# Patient Record
Sex: MALE | Employment: FULL TIME | URBAN - METROPOLITAN AREA
[De-identification: names, ages, dates, MRNs, and addresses within clinical notes are randomized per-mention and may not be internally consistent; named-entity substitution may affect disease eponyms.]

---

## 2018-09-19 ENCOUNTER — OFFICE VISIT (OUTPATIENT)
Dept: ORTHOPEDIC SURGERY | Age: 38
End: 2018-09-19

## 2018-09-19 VITALS — HEIGHT: 72 IN | BODY MASS INDEX: 29.8 KG/M2 | WEIGHT: 220 LBS | RESPIRATION RATE: 14 BRPM

## 2018-09-19 DIAGNOSIS — M25.562 ACUTE PAIN OF LEFT KNEE: ICD-10-CM

## 2018-09-19 DIAGNOSIS — S83.242A TEAR OF MEDIAL MENISCUS OF LEFT KNEE, CURRENT, UNSPECIFIED TEAR TYPE, INITIAL ENCOUNTER: Primary | ICD-10-CM

## 2018-09-19 PROCEDURE — 99202 OFFICE O/P NEW SF 15 MIN: CPT | Performed by: PHYSICIAN ASSISTANT

## 2018-09-19 RX ORDER — DEXTROAMPHETAMINE SACCHARATE, AMPHETAMINE ASPARTATE, DEXTROAMPHETAMINE SULFATE AND AMPHETAMINE SULFATE 5; 5; 5; 5 MG/1; MG/1; MG/1; MG/1
60 TABLET ORAL
COMMUNITY
End: 2018-10-17

## 2018-09-19 RX ORDER — DEXTROAMPHETAMINE SACCHARATE, AMPHETAMINE ASPARTATE MONOHYDRATE, DEXTROAMPHETAMINE SULFATE AND AMPHETAMINE SULFATE 7.5; 7.5; 7.5; 7.5 MG/1; MG/1; MG/1; MG/1
CAPSULE, EXTENDED RELEASE ORAL
Refills: 0 | COMMUNITY
Start: 2018-08-27 | End: 2018-10-17

## 2018-09-19 ASSESSMENT — ENCOUNTER SYMPTOMS
GASTROINTESTINAL NEGATIVE: 1
EYES NEGATIVE: 1
RESPIRATORY NEGATIVE: 1

## 2018-09-19 NOTE — PROGRESS NOTES
Review of Systems   Constitutional: Negative. HENT: Negative. Eyes: Negative. Respiratory: Negative. Cardiovascular: Negative. Gastrointestinal: Negative. Genitourinary: Negative. Musculoskeletal: Positive for joint pain and myalgias. Skin: Negative. Neurological: Negative. Endo/Heme/Allergies: Positive for environmental allergies. Psychiatric/Behavioral: Negative. HPI:  Vila Curling is a 43y.o. year old male who complains of Left knee pain and swelling. The Left knee pain assessment is:   Intensity: 7/10   Location:        medial   Description:    aching and sharp    The symptoms started 9/13/18  Cause of injury was  Sports injury (patient was Punting a football and came down on the left leg and felt immediate pain on the medial aspect of his knee. .  Previous treatment for this episode has included NSAIDS, tylenol, ice and rest.    Symptoms improve with rest, avoiding painful activities. The symptoms are worse with activity, deep knee bending, weight bearing, running. The progression of symptoms, overall course: waxing and waning but worse overall. Prior to this episode, knee history is: surgery including meniscus repair in 2005, unsure of physician     Is [facility]     No past medical history on file. No past surgical history on file. No family history on file.     Social History     Social History    Marital status:      Spouse name: N/A    Number of children: N/A    Years of education: N/A     Social History Main Topics    Smoking status: Never Smoker    Smokeless tobacco: Never Used    Alcohol use None    Drug use: Unknown    Sexual activity: Not Asked     Other Topics Concern    None     Social History Narrative    None       Current Outpatient Prescriptions   Medication Sig Dispense Refill    amphetamine-dextroamphetamine (ADDERALL XR) 30 MG extended release capsule TK 1 C PO BID  0    amphetamine-dextroamphetamine discussed. Questions answered. Continue ibuprofen as needed   Add tylenol as needed   Continue ice as needed   MRI left knee   Call office once MRI is complete     The patient was advised that NSAID-type medications have two very important potential side effects: gastrointestinal irritation including hemorrhage and renal injuries. He was asked to take the medication with food and to stop if he experiences any GI upset. I asked him to call for vomiting, abdominal pain or black/bloody stools. The patient expresses understanding of these issues and questions were answered.     Add Tylenol (also known as acetaminophen) as needed   Take 2 extra strength (500 mg) or 3 regular strength (325 mg) up to three times a day  It is OK to take Tylenol in conjunction with NSAID's (Advil, Aleve, Naprosyn, etc)  If taking other medications that have acetaminophen ensure total acetaminophen dose for any 24 period is less than 3,000 mg

## 2018-10-06 ENCOUNTER — HOSPITAL ENCOUNTER (OUTPATIENT)
Dept: MRI IMAGING | Age: 38
Discharge: HOME | End: 2018-10-06
Payer: COMMERCIAL

## 2018-10-06 DIAGNOSIS — S83.242A TEAR OF MEDIAL MENISCUS OF LEFT KNEE, CURRENT, UNSPECIFIED TEAR TYPE, INITIAL ENCOUNTER: ICD-10-CM

## 2018-10-06 PROCEDURE — 73721 MRI JNT OF LWR EXTRE W/O DYE: CPT

## 2018-10-11 ENCOUNTER — OFFICE VISIT (OUTPATIENT)
Dept: ORTHOPEDIC SURGERY | Age: 38
End: 2018-10-11
Payer: COMMERCIAL

## 2018-10-11 ENCOUNTER — TELEPHONE (OUTPATIENT)
Dept: ORTHOPEDIC SURGERY | Age: 38
End: 2018-10-11

## 2018-10-11 VITALS — RESPIRATION RATE: 12 BRPM | WEIGHT: 220 LBS | HEIGHT: 72 IN | BODY MASS INDEX: 29.8 KG/M2

## 2018-10-11 DIAGNOSIS — S83.242A TEAR OF MEDIAL MENISCUS OF LEFT KNEE, CURRENT, UNSPECIFIED TEAR TYPE, INITIAL ENCOUNTER: Primary | ICD-10-CM

## 2018-10-11 DIAGNOSIS — Z01.818 PREOP EXAMINATION: ICD-10-CM

## 2018-10-11 PROCEDURE — 99203 OFFICE O/P NEW LOW 30 MIN: CPT | Performed by: ORTHOPAEDIC SURGERY

## 2018-10-11 ASSESSMENT — ENCOUNTER SYMPTOMS
GASTROINTESTINAL NEGATIVE: 1
RESPIRATORY NEGATIVE: 1
EYES NEGATIVE: 1

## 2018-10-11 NOTE — PATIENT INSTRUCTIONS
No med list provided   Office will call you with surgery date and time   Follow up for surgery   Call office with any questions

## 2018-10-12 ENCOUNTER — TELEPHONE (OUTPATIENT)
Dept: ORTHOPEDIC SURGERY | Age: 38
End: 2018-10-12

## 2018-10-12 NOTE — TELEPHONE ENCOUNTER
Surgery confirmed for 10/22/18 at [facility] at 11 AM.   PAT scheduled for 10/17/18 at [facility] at 10 AM.   Post Op scheduled for 11/1/18 at 2:40 PM. Awaiting for post op PT to be scheduled. Patient has been called and informed.

## 2018-10-15 ENCOUNTER — ANESTHESIA EVENT (OUTPATIENT)
Dept: PREADMISSION TESTING | Age: 38
End: 2018-10-15

## 2018-10-17 ENCOUNTER — TELEPHONE (OUTPATIENT)
Dept: ORTHOPEDIC SURGERY | Age: 38
End: 2018-10-17

## 2018-10-17 ENCOUNTER — HOSPITAL ENCOUNTER (OUTPATIENT)
Dept: PREADMISSION TESTING | Age: 38
Discharge: HOME | End: 2018-10-21
Payer: COMMERCIAL

## 2018-10-17 VITALS
SYSTOLIC BLOOD PRESSURE: 137 MMHG | HEART RATE: 88 BPM | TEMPERATURE: 97 F | RESPIRATION RATE: 16 BRPM | HEIGHT: 71 IN | DIASTOLIC BLOOD PRESSURE: 96 MMHG | WEIGHT: 221.5 LBS | BODY MASS INDEX: 31.01 KG/M2 | OXYGEN SATURATION: 98 %

## 2018-10-17 LAB
ANION GAP SERPL CALCULATED.3IONS-SCNC: 13 MMOL/L (ref 4–16)
BUN BLDV-MCNC: 12 MG/DL (ref 6–23)
CALCIUM SERPL-MCNC: 9.5 MG/DL (ref 8.3–10.6)
CHLORIDE BLD-SCNC: 101 MMOL/L (ref 99–110)
CO2: 27 MMOL/L (ref 21–32)
CREAT SERPL-MCNC: 1.2 MG/DL (ref 0.9–1.3)
GFR AFRICAN AMERICAN: >60 ML/MIN/1.73M2
GFR NON-AFRICAN AMERICAN: >60 ML/MIN/1.73M2
GLUCOSE BLD-MCNC: 75 MG/DL (ref 70–99)
HCT VFR BLD CALC: 51 % (ref 42–52)
HEMOGLOBIN: 17.5 GM/DL (ref 13.5–18)
MCH RBC QN AUTO: 31.5 PG (ref 27–31)
MCHC RBC AUTO-ENTMCNC: 34.3 % (ref 32–36)
MCV RBC AUTO: 91.9 FL (ref 78–100)
PDW BLD-RTO: 12.4 % (ref 11.7–14.9)
PLATELET # BLD: 300 K/CU MM (ref 140–440)
PMV BLD AUTO: 8.9 FL (ref 7.5–11.1)
POTASSIUM SERPL-SCNC: 4.4 MMOL/L (ref 3.5–5.1)
RBC # BLD: 5.55 M/CU MM (ref 4.6–6.2)
SODIUM BLD-SCNC: 141 MMOL/L (ref 135–145)
WBC # BLD: 6.9 K/CU MM (ref 4–10.5)

## 2018-10-17 PROCEDURE — 36415 COLL VENOUS BLD VENIPUNCTURE: CPT

## 2018-10-17 PROCEDURE — 80048 BASIC METABOLIC PNL TOTAL CA: CPT

## 2018-10-17 PROCEDURE — 85027 COMPLETE CBC AUTOMATED: CPT

## 2018-10-17 RX ORDER — DEXTROAMPHETAMINE SACCHARATE, AMPHETAMINE ASPARTATE MONOHYDRATE, DEXTROAMPHETAMINE SULFATE AND AMPHETAMINE SULFATE 7.5; 7.5; 7.5; 7.5 MG/1; MG/1; MG/1; MG/1
30 CAPSULE, EXTENDED RELEASE ORAL 2 TIMES DAILY
COMMUNITY

## 2018-10-21 NOTE — H&P
no  Posterior drawer:                                Yes   Lateral patella glide at 30 deg's:         20%%                                                                          With no apprehension  Medial patella glide at 30 deg's:         10mm  Increased ER at 30 deg's:                  no  Varus laxity at 0 and 30 deg's:           no  Valgus laxity at 0 and 30 deg's:          no  Recurvatum:                                        no  Tenderness at:                                    Medial joint line with yes - medial Barbra     Knee strength is 5/5 flexion and extension  There is + L2-S1 motor and sensory function.      EXAMINATION:   4 XRAY VIEWS OF THE LEFT KNEE  9/19/2018 9:05 am       COMPARISON:   None.       HISTORY:   ORDERING SYSTEM PROVIDED HISTORY: Acute pain of left knee   TECHNOLOGIST PROVIDED HISTORY:       Initial evaluation       FINDINGS:   No acute fracture or dislocation is present.  Mild medial and patellofemoral   compartment osteoarthritis.  Nonspecific joint effusion.  No erosions are   present.           Impression   1. No acute osseous abnormality. 2. Mild medial and patellofemoral compartment osteoarthritis. 3. Nonspecific joint effusion.                   MRI LEFT KNEE 10/6/18  Impression   1. Oblique tear contacting the inferior articular surface of the posterior   horn and junction of the body and posterior horn of the medial meniscus. 2. Mild tricompartmental degenerative changes. 3. Large joint effusion.                    Impression:  left knee recurrent medial meniscus tear  Left knee chronic PCL tear        Plan:  Natural history and expected course discussed. Questions answered. I recommended and he does desire to proceed with arthroscopic treatment of the knee. The surgery will be for the medial meniscus tear and will not address the chronic PCL issue.   He would like to proceed with the surgery as soon as it can be scheduled we will schedule

## 2018-10-22 ENCOUNTER — HOSPITAL ENCOUNTER (OUTPATIENT)
Age: 38
Setting detail: OUTPATIENT SURGERY
Discharge: HOME | End: 2018-10-22
Attending: ORTHOPAEDIC SURGERY | Admitting: ORTHOPAEDIC SURGERY
Payer: COMMERCIAL

## 2018-10-22 ENCOUNTER — ANESTHESIA (OUTPATIENT)
Dept: OPERATING ROOM | Age: 38
End: 2018-10-22
Payer: COMMERCIAL

## 2018-10-22 ENCOUNTER — ANESTHESIA EVENT (OUTPATIENT)
Dept: OPERATING ROOM | Age: 38
End: 2018-10-22
Payer: COMMERCIAL

## 2018-10-22 VITALS
TEMPERATURE: 97.6 F | SYSTOLIC BLOOD PRESSURE: 144 MMHG | BODY MASS INDEX: 31.47 KG/M2 | HEIGHT: 71 IN | HEART RATE: 71 BPM | RESPIRATION RATE: 16 BRPM | WEIGHT: 224.8 LBS | OXYGEN SATURATION: 97 % | DIASTOLIC BLOOD PRESSURE: 97 MMHG

## 2018-10-22 VITALS
DIASTOLIC BLOOD PRESSURE: 83 MMHG | OXYGEN SATURATION: 100 % | SYSTOLIC BLOOD PRESSURE: 120 MMHG | RESPIRATION RATE: 2 BRPM | TEMPERATURE: 97 F

## 2018-10-22 DIAGNOSIS — S83.242A TEAR OF MEDIAL MENISCUS OF LEFT KNEE, CURRENT, UNSPECIFIED TEAR TYPE, INITIAL ENCOUNTER: Primary | ICD-10-CM

## 2018-10-22 PROCEDURE — 6370000000 HC RX 637 (ALT 250 FOR IP): Performed by: ORTHOPAEDIC SURGERY

## 2018-10-22 PROCEDURE — 7100000000 HC PACU RECOVERY - FIRST 15 MIN: Performed by: ORTHOPAEDIC SURGERY

## 2018-10-22 PROCEDURE — 2500000003 HC RX 250 WO HCPCS: Performed by: ORTHOPAEDIC SURGERY

## 2018-10-22 PROCEDURE — 2709999900 HC NON-CHARGEABLE SUPPLY: Performed by: ORTHOPAEDIC SURGERY

## 2018-10-22 PROCEDURE — 3600000014 HC SURGERY LEVEL 4 ADDTL 15MIN: Performed by: ORTHOPAEDIC SURGERY

## 2018-10-22 PROCEDURE — 6360000002 HC RX W HCPCS: Performed by: ORTHOPAEDIC SURGERY

## 2018-10-22 PROCEDURE — 29881 ARTHRS KNE SRG MNISECTMY M/L: CPT | Performed by: ORTHOPAEDIC SURGERY

## 2018-10-22 PROCEDURE — 6360000002 HC RX W HCPCS: Performed by: ANESTHESIOLOGY

## 2018-10-22 PROCEDURE — 3600000004 HC SURGERY LEVEL 4 BASE: Performed by: ORTHOPAEDIC SURGERY

## 2018-10-22 PROCEDURE — 6360000002 HC RX W HCPCS: Performed by: NURSE ANESTHETIST, CERTIFIED REGISTERED

## 2018-10-22 PROCEDURE — 3700000001 HC ADD 15 MINUTES (ANESTHESIA): Performed by: ORTHOPAEDIC SURGERY

## 2018-10-22 PROCEDURE — 7100000011 HC PHASE II RECOVERY - ADDTL 15 MIN: Performed by: ORTHOPAEDIC SURGERY

## 2018-10-22 PROCEDURE — 2500000003 HC RX 250 WO HCPCS: Performed by: ANESTHESIOLOGY

## 2018-10-22 PROCEDURE — 7100000001 HC PACU RECOVERY - ADDTL 15 MIN: Performed by: ORTHOPAEDIC SURGERY

## 2018-10-22 PROCEDURE — 3700000000 HC ANESTHESIA ATTENDED CARE: Performed by: ORTHOPAEDIC SURGERY

## 2018-10-22 PROCEDURE — 2580000003 HC RX 258: Performed by: ORTHOPAEDIC SURGERY

## 2018-10-22 PROCEDURE — 7100000010 HC PHASE II RECOVERY - FIRST 15 MIN: Performed by: ORTHOPAEDIC SURGERY

## 2018-10-22 RX ORDER — HYDROMORPHONE HCL 110MG/55ML
0.5 PATIENT CONTROLLED ANALGESIA SYRINGE INTRAVENOUS EVERY 5 MIN PRN
Status: DISCONTINUED | OUTPATIENT
Start: 2018-10-22 | End: 2018-10-22 | Stop reason: HOSPADM

## 2018-10-22 RX ORDER — FENTANYL CITRATE 50 UG/ML
INJECTION, SOLUTION INTRAMUSCULAR; INTRAVENOUS PRN
Status: DISCONTINUED | OUTPATIENT
Start: 2018-10-22 | End: 2018-10-22 | Stop reason: SDUPTHER

## 2018-10-22 RX ORDER — ONDANSETRON 2 MG/ML
4 INJECTION INTRAMUSCULAR; INTRAVENOUS EVERY 6 HOURS PRN
Status: DISCONTINUED | OUTPATIENT
Start: 2018-10-22 | End: 2018-10-22 | Stop reason: HOSPADM

## 2018-10-22 RX ORDER — ACETAMINOPHEN 325 MG/1
650 TABLET ORAL EVERY 4 HOURS PRN
Status: DISCONTINUED | OUTPATIENT
Start: 2018-10-22 | End: 2018-10-22 | Stop reason: HOSPADM

## 2018-10-22 RX ORDER — OXYCODONE HYDROCHLORIDE AND ACETAMINOPHEN 5; 325 MG/1; MG/1
1 TABLET ORAL EVERY 4 HOURS PRN
Status: DISCONTINUED | OUTPATIENT
Start: 2018-10-22 | End: 2018-10-22 | Stop reason: HOSPADM

## 2018-10-22 RX ORDER — CEFAZOLIN SODIUM 1 G/3ML
INJECTION, POWDER, FOR SOLUTION INTRAMUSCULAR; INTRAVENOUS PRN
Status: DISCONTINUED | OUTPATIENT
Start: 2018-10-22 | End: 2018-10-22 | Stop reason: SDUPTHER

## 2018-10-22 RX ORDER — SODIUM CHLORIDE, SODIUM LACTATE, POTASSIUM CHLORIDE, CALCIUM CHLORIDE 600; 310; 30; 20 MG/100ML; MG/100ML; MG/100ML; MG/100ML
INJECTION, SOLUTION INTRAVENOUS CONTINUOUS
Status: DISCONTINUED | OUTPATIENT
Start: 2018-10-22 | End: 2018-10-22 | Stop reason: HOSPADM

## 2018-10-22 RX ORDER — DEXAMETHASONE SODIUM PHOSPHATE 4 MG/ML
INJECTION, SOLUTION INTRA-ARTICULAR; INTRALESIONAL; INTRAMUSCULAR; INTRAVENOUS; SOFT TISSUE PRN
Status: DISCONTINUED | OUTPATIENT
Start: 2018-10-22 | End: 2018-10-22 | Stop reason: SDUPTHER

## 2018-10-22 RX ORDER — ACETAMINOPHEN 10 MG/ML
1000 INJECTION, SOLUTION INTRAVENOUS EVERY 6 HOURS PRN
Status: DISCONTINUED | OUTPATIENT
Start: 2018-10-22 | End: 2018-10-22 | Stop reason: HOSPADM

## 2018-10-22 RX ORDER — PROPOFOL 10 MG/ML
INJECTION, EMULSION INTRAVENOUS PRN
Status: DISCONTINUED | OUTPATIENT
Start: 2018-10-22 | End: 2018-10-22 | Stop reason: SDUPTHER

## 2018-10-22 RX ORDER — SODIUM CHLORIDE 0.9 % (FLUSH) 0.9 %
10 SYRINGE (ML) INJECTION PRN
Status: DISCONTINUED | OUTPATIENT
Start: 2018-10-22 | End: 2018-10-22 | Stop reason: HOSPADM

## 2018-10-22 RX ORDER — KETOROLAC TROMETHAMINE 30 MG/ML
INJECTION, SOLUTION INTRAMUSCULAR; INTRAVENOUS PRN
Status: DISCONTINUED | OUTPATIENT
Start: 2018-10-22 | End: 2018-10-22 | Stop reason: SDUPTHER

## 2018-10-22 RX ORDER — LIDOCAINE HYDROCHLORIDE 20 MG/ML
INJECTION, SOLUTION INFILTRATION; PERINEURAL PRN
Status: DISCONTINUED | OUTPATIENT
Start: 2018-10-22 | End: 2018-10-22 | Stop reason: SDUPTHER

## 2018-10-22 RX ORDER — SODIUM CHLORIDE 0.9 % (FLUSH) 0.9 %
10 SYRINGE (ML) INJECTION EVERY 12 HOURS SCHEDULED
Status: DISCONTINUED | OUTPATIENT
Start: 2018-10-22 | End: 2018-10-22 | Stop reason: HOSPADM

## 2018-10-22 RX ORDER — OXYCODONE HYDROCHLORIDE AND ACETAMINOPHEN 5; 325 MG/1; MG/1
2 TABLET ORAL EVERY 4 HOURS PRN
Status: DISCONTINUED | OUTPATIENT
Start: 2018-10-22 | End: 2018-10-22 | Stop reason: HOSPADM

## 2018-10-22 RX ORDER — OXYCODONE HYDROCHLORIDE AND ACETAMINOPHEN 5; 325 MG/1; MG/1
1 TABLET ORAL EVERY 4 HOURS PRN
Qty: 25 TABLET | Refills: 0 | Status: SHIPPED | OUTPATIENT
Start: 2018-10-22 | End: 2018-10-29

## 2018-10-22 RX ORDER — BUPIVACAINE HYDROCHLORIDE AND EPINEPHRINE 5; 5 MG/ML; UG/ML
INJECTION, SOLUTION EPIDURAL; INTRACAUDAL; PERINEURAL PRN
Status: DISCONTINUED | OUTPATIENT
Start: 2018-10-22 | End: 2018-10-22 | Stop reason: HOSPADM

## 2018-10-22 RX ORDER — FENTANYL CITRATE 50 UG/ML
25 INJECTION, SOLUTION INTRAMUSCULAR; INTRAVENOUS EVERY 5 MIN PRN
Status: DISCONTINUED | OUTPATIENT
Start: 2018-10-22 | End: 2018-10-22 | Stop reason: HOSPADM

## 2018-10-22 RX ORDER — PROMETHAZINE HYDROCHLORIDE 25 MG/1
25 TABLET ORAL EVERY 6 HOURS PRN
Qty: 5 TABLET | Refills: 0 | Status: SHIPPED | OUTPATIENT
Start: 2018-10-22 | End: 2019-05-30

## 2018-10-22 RX ORDER — ONDANSETRON 2 MG/ML
INJECTION INTRAMUSCULAR; INTRAVENOUS PRN
Status: DISCONTINUED | OUTPATIENT
Start: 2018-10-22 | End: 2018-10-22 | Stop reason: SDUPTHER

## 2018-10-22 RX ADMIN — PROPOFOL 200 MG: 10 INJECTION, EMULSION INTRAVENOUS at 11:36

## 2018-10-22 RX ADMIN — LIDOCAINE HYDROCHLORIDE 30 MG: 20 INJECTION, SOLUTION INFILTRATION; PERINEURAL at 11:36

## 2018-10-22 RX ADMIN — SODIUM CHLORIDE, POTASSIUM CHLORIDE, SODIUM LACTATE AND CALCIUM CHLORIDE: 600; 310; 30; 20 INJECTION, SOLUTION INTRAVENOUS at 10:14

## 2018-10-22 RX ADMIN — FENTANYL CITRATE 250 MCG: 50 INJECTION, SOLUTION INTRAMUSCULAR; INTRAVENOUS at 11:37

## 2018-10-22 RX ADMIN — DEXAMETHASONE SODIUM PHOSPHATE 4 MG: 4 INJECTION, SOLUTION INTRAMUSCULAR; INTRAVENOUS at 11:36

## 2018-10-22 RX ADMIN — OXYCODONE HYDROCHLORIDE AND ACETAMINOPHEN 1 TABLET: 5; 325 TABLET ORAL at 13:02

## 2018-10-22 RX ADMIN — Medication 4 MG: at 11:59

## 2018-10-22 RX ADMIN — ACETAMINOPHEN 1000 MG: 10 INJECTION, SOLUTION INTRAVENOUS at 10:41

## 2018-10-22 RX ADMIN — CEFAZOLIN 2000 MG: 1 INJECTION, POWDER, FOR SOLUTION INTRAMUSCULAR; INTRAVENOUS; PARENTERAL at 11:54

## 2018-10-22 RX ADMIN — KETOROLAC TROMETHAMINE 30 MG: 30 INJECTION, SOLUTION INTRAMUSCULAR; INTRAVENOUS at 12:27

## 2018-10-22 ASSESSMENT — PAIN DESCRIPTION - PAIN TYPE
TYPE: SURGICAL PAIN

## 2018-10-22 ASSESSMENT — PULMONARY FUNCTION TESTS
PIF_VALUE: 1
PIF_VALUE: 17
PIF_VALUE: 17
PIF_VALUE: 18
PIF_VALUE: 5
PIF_VALUE: 15
PIF_VALUE: 18
PIF_VALUE: 5
PIF_VALUE: 2
PIF_VALUE: 17
PIF_VALUE: 18
PIF_VALUE: 0
PIF_VALUE: 17
PIF_VALUE: 18
PIF_VALUE: 17
PIF_VALUE: 19
PIF_VALUE: 15
PIF_VALUE: 17
PIF_VALUE: 18
PIF_VALUE: 17
PIF_VALUE: 0
PIF_VALUE: 17
PIF_VALUE: 8
PIF_VALUE: 18
PIF_VALUE: 6
PIF_VALUE: 0
PIF_VALUE: 18
PIF_VALUE: 17
PIF_VALUE: 6
PIF_VALUE: 0
PIF_VALUE: 6
PIF_VALUE: 8
PIF_VALUE: 0
PIF_VALUE: 5
PIF_VALUE: 6
PIF_VALUE: 17
PIF_VALUE: 2
PIF_VALUE: 25
PIF_VALUE: 17
PIF_VALUE: 15
PIF_VALUE: 18
PIF_VALUE: 17
PIF_VALUE: 18
PIF_VALUE: 18
PIF_VALUE: 0
PIF_VALUE: 18
PIF_VALUE: 1
PIF_VALUE: 18
PIF_VALUE: 17
PIF_VALUE: 19
PIF_VALUE: 2
PIF_VALUE: 7
PIF_VALUE: 17
PIF_VALUE: 18
PIF_VALUE: 5
PIF_VALUE: 0
PIF_VALUE: 18
PIF_VALUE: 6
PIF_VALUE: 17
PIF_VALUE: 18
PIF_VALUE: 1
PIF_VALUE: 18
PIF_VALUE: 2
PIF_VALUE: 18
PIF_VALUE: 0
PIF_VALUE: 6
PIF_VALUE: 8
PIF_VALUE: 0

## 2018-10-22 ASSESSMENT — PAIN SCALES - GENERAL
PAINLEVEL_OUTOF10: 4
PAINLEVEL_OUTOF10: 4
PAINLEVEL_OUTOF10: 3
PAINLEVEL_OUTOF10: 4
PAINLEVEL_OUTOF10: 3

## 2018-10-22 ASSESSMENT — PAIN DESCRIPTION - ORIENTATION
ORIENTATION: LEFT

## 2018-10-22 ASSESSMENT — PAIN DESCRIPTION - DESCRIPTORS
DESCRIPTORS: ACHING
DESCRIPTORS: DULL
DESCRIPTORS: ACHING
DESCRIPTORS: ACHING

## 2018-10-22 ASSESSMENT — PAIN DESCRIPTION - LOCATION
LOCATION: KNEE

## 2018-10-22 ASSESSMENT — PAIN - FUNCTIONAL ASSESSMENT: PAIN_FUNCTIONAL_ASSESSMENT: 0-10

## 2018-10-22 NOTE — ANESTHESIA PRE PROCEDURE
intravenous. Anesthetic plan and risks discussed with patient. Plan discussed with CRNA.     Attending anesthesiologist reviewed and agrees with Pre Eval content            NADEEM Cheng - CRNA   10/22/2018

## 2018-10-22 NOTE — PROGRESS NOTES
1233 Patient transferred from OR to [facility] via cart, he is awake and states his pain score is 4, denies nausea at this time. 200 Dr Abbasi Blade in to talk to patient. 1302 Patient medicated for pain with one percocet. 1315 Patient and his father given home instructions. 1330 Patient up to bathroom to void.

## 2018-10-23 ENCOUNTER — HOSPITAL ENCOUNTER (OUTPATIENT)
Dept: PHYSICAL THERAPY | Age: 38
Setting detail: THERAPIES SERIES
Discharge: HOME | End: 2018-10-23
Payer: COMMERCIAL

## 2018-10-23 ENCOUNTER — OFFICE VISIT (OUTPATIENT)
Dept: ORTHOPEDIC SURGERY | Age: 38
End: 2018-10-23

## 2018-10-23 DIAGNOSIS — S83.242A ACUTE MEDIAL MENISCUS TEAR OF LEFT KNEE, INITIAL ENCOUNTER: Primary | ICD-10-CM

## 2018-10-23 PROCEDURE — 99024 POSTOP FOLLOW-UP VISIT: CPT | Performed by: ORTHOPAEDIC SURGERY

## 2018-10-23 PROCEDURE — G8979 MOBILITY GOAL STATUS: HCPCS

## 2018-10-23 PROCEDURE — 97110 THERAPEUTIC EXERCISES: CPT

## 2018-10-23 PROCEDURE — G8978 MOBILITY CURRENT STATUS: HCPCS

## 2018-10-23 PROCEDURE — 97161 PT EVAL LOW COMPLEX 20 MIN: CPT

## 2018-10-23 ASSESSMENT — PAIN DESCRIPTION - FREQUENCY: FREQUENCY: INTERMITTENT

## 2018-10-23 ASSESSMENT — PAIN DESCRIPTION - LOCATION: LOCATION: KNEE

## 2018-10-23 ASSESSMENT — PAIN DESCRIPTION - ONSET: ONSET: SUDDEN

## 2018-10-23 ASSESSMENT — PAIN DESCRIPTION - ORIENTATION: ORIENTATION: LEFT

## 2018-10-23 ASSESSMENT — PAIN DESCRIPTION - PROGRESSION: CLINICAL_PROGRESSION: RAPIDLY IMPROVING

## 2018-10-23 ASSESSMENT — PAIN DESCRIPTION - DESCRIPTORS: DESCRIPTORS: ACHING

## 2018-11-01 ENCOUNTER — OFFICE VISIT (OUTPATIENT)
Dept: ORTHOPEDIC SURGERY | Age: 38
End: 2018-11-01

## 2018-11-01 DIAGNOSIS — Z98.890 S/P ARTHROSCOPY OF LEFT KNEE: Primary | ICD-10-CM

## 2018-11-01 PROCEDURE — 99024 POSTOP FOLLOW-UP VISIT: CPT | Performed by: PHYSICIAN ASSISTANT

## 2018-11-01 NOTE — PROGRESS NOTES
Scribe Authentication Statement  Blanco Ro, scribed portions of this documentation for and in the presence of Zoya Camarena PA-C on 11/1/18 at 2:56 PM.  Provider Authentication Statement:  Christiano HEADLEY PA-C, personally performed the services described in this documentation and they were scribed in my presence by the above listed scribe. The documentation is both accurate and complete. Date of surgery:   October 22nd     History:   Mr. Fredy Ochoa is here in follow up regarding SURGICAL PROCEDURE:  Left knee arthroscopic partial medial  meniscectomy and then chondroplasty of the medial femoral condyle,  lateral tibial plateau and patella. He is doing rather well. He is having 0/10 pain. He denies chest pain, SOB,fever,wound drainage. No other issues. Physical: He demonstrates appropriate mood and affect. leftLeg exam:  The incisions are clean, dry, intact and nontender with no erythema. He has mild edema, the Leg compartments are soft . There is no calf tenderness  No significant calf/ankle edema. He is neurovascularly intact distally.  ROM of the left knee is mildly limited with flexion    Impression: Status post above, doing well       Plan:   Sutures removed  Continue pt protocol  Return to the office as needed

## 2019-05-30 ENCOUNTER — OFFICE VISIT (OUTPATIENT)
Dept: ORTHOPEDIC SURGERY | Age: 39
End: 2019-05-30
Payer: COMMERCIAL

## 2019-05-30 VITALS — RESPIRATION RATE: 14 BRPM | WEIGHT: 226 LBS | BODY MASS INDEX: 30.61 KG/M2 | HEIGHT: 72 IN

## 2019-05-30 DIAGNOSIS — S46.111A RUPTURE OF RIGHT LONG HEAD BICEPS TENDON, INITIAL ENCOUNTER: Primary | ICD-10-CM

## 2019-05-30 PROCEDURE — 99212 OFFICE O/P EST SF 10 MIN: CPT | Performed by: PHYSICIAN ASSISTANT

## 2019-05-30 RX ORDER — HYDROXYZINE HYDROCHLORIDE 25 MG/1
TABLET, FILM COATED ORAL
Refills: 1 | COMMUNITY
Start: 2019-05-24 | End: 2019-05-30 | Stop reason: ALTCHOICE

## 2019-06-24 ASSESSMENT — ENCOUNTER SYMPTOMS
RESPIRATORY NEGATIVE: 1
EYES NEGATIVE: 1
GASTROINTESTINAL NEGATIVE: 1

## 2019-06-24 NOTE — PROGRESS NOTES
Worry: None     Inability: None    Transportation needs:     Medical: None     Non-medical: None   Tobacco Use    Smoking status: Never Smoker    Smokeless tobacco: Never Used   Substance and Sexual Activity    Alcohol use: Yes     Comment: \"A Couple Times A Week\"    Drug use: No    Sexual activity: Not Currently   Lifestyle    Physical activity:     Days per week: None     Minutes per session: None    Stress: None   Relationships    Social connections:     Talks on phone: None     Gets together: None     Attends [employer] service: None     Active member of club or organization: None     Attends meetings of clubs or organizations: None     Relationship status: None    Intimate partner violence:     Fear of current or ex partner: None     Emotionally abused: None     Physically abused: None     Forced sexual activity: None   Other Topics Concern    None   Social History Narrative    None       Current Outpatient Medications   Medication Sig Dispense Refill    amphetamine-dextroamphetamine (ADDERALL XR) 30 MG extended release capsule Take 30 mg by mouth 2 times daily. Aristides Alains Aspirin-Acetaminophen-Caffeine (EXCEDRIN PO) Take by mouth as needed Over The Counter       No current facility-administered medications for this visit. No Known Allergies    Vitals:    05/30/19 1358   Resp: 14   Weight: 226 lb (102.5 kg)   Height: 6' (1.829 m)         Gen/Psych:Examination reveals a pleasant individual in no acute distress. The patient is oriented to time, place and person. The patient's mood and affect are appropriate. Patient appears well nourished.  Body habitus is overweight    Head: Head is atraumatic normocephalic,  ears are symmetric,     Eyes: Eyes show equal pupils bilaterally, extraocular muscles intact    Ears:  Hearing is intact to normal voice at 5 feet    Nose: Nares are patent bilaterally, no epistaxis,no rhinorrhea     Lymph: no obvious lymphedema in bilateral upper extremities     Skin intact in bilateral upper extremities with no ulcerations, lesions, rash, erythema. Vascular: There are no varicosities in bilateral upper  extremities, sensation intact to light touch over bilateral upper extremities.     Right shoulder exam:  Skin:  Clear with no erythema, there is no significant joint effusion  Deformity:  none  Atrophy:  none  Tenderness:  biceps tendon  Active ROM:   FE:180    IR side: T10           ER side: 30   Abduction: 180      Passive ROM is the same as active  Strength: FE:5/5     ER:5/5 IR:5/5      Elbow flexion: 5/5  [place]er:  not positive  [place]:  not positive  Yergason:mildly positive  [place]:  mildly positive  There is mild deformity in the proximal biceps consistent with rupture of long head of biceps tendon  The distal biceps tendon in the antecubital fossa is palpated, nontender and intact on physical exam      Cervical Spine tenderness: no     Imaging: No x-rays of the shoulder taken today    Assessment: Rupture of long head of biceps tendon of right shoulder    Plan:  Gradually increase activity as tolerated   Call office if persistent or worsening symptoms

## 2019-07-24 ENCOUNTER — OFFICE VISIT (OUTPATIENT)
Dept: FAMILY MEDICINE CLINIC | Age: 39
End: 2019-07-24
Payer: COMMERCIAL

## 2019-07-24 VITALS
SYSTOLIC BLOOD PRESSURE: 132 MMHG | BODY MASS INDEX: 30.38 KG/M2 | DIASTOLIC BLOOD PRESSURE: 94 MMHG | HEART RATE: 86 BPM | OXYGEN SATURATION: 97 % | WEIGHT: 224 LBS

## 2019-07-24 DIAGNOSIS — M54.50 LUMBAR PAIN: Primary | ICD-10-CM

## 2019-07-24 PROCEDURE — 99213 OFFICE O/P EST LOW 20 MIN: CPT | Performed by: NURSE PRACTITIONER

## 2019-07-24 PROCEDURE — 96372 THER/PROPH/DIAG INJ SC/IM: CPT | Performed by: NURSE PRACTITIONER

## 2019-07-24 RX ORDER — KETOROLAC TROMETHAMINE 30 MG/ML
30 INJECTION, SOLUTION INTRAMUSCULAR; INTRAVENOUS ONCE
Status: DISCONTINUED | OUTPATIENT
Start: 2019-07-24 | End: 2019-07-24

## 2019-07-24 RX ORDER — PREDNISONE 20 MG/1
20 TABLET ORAL 2 TIMES DAILY
Qty: 10 TABLET | Refills: 0 | Status: SHIPPED | OUTPATIENT
Start: 2019-07-24 | End: 2019-07-29

## 2019-07-24 RX ORDER — KETOROLAC TROMETHAMINE 30 MG/ML
30 INJECTION, SOLUTION INTRAMUSCULAR; INTRAVENOUS ONCE
Status: COMPLETED | OUTPATIENT
Start: 2019-07-24 | End: 2019-07-24

## 2019-07-24 RX ORDER — TIZANIDINE 4 MG/1
4 TABLET ORAL 3 TIMES DAILY
Qty: 30 TABLET | Refills: 0 | Status: SHIPPED | OUTPATIENT
Start: 2019-07-24

## 2019-07-24 RX ADMIN — KETOROLAC TROMETHAMINE 30 MG: 30 INJECTION, SOLUTION INTRAMUSCULAR; INTRAVENOUS at 15:54

## 2019-07-24 ASSESSMENT — PATIENT HEALTH QUESTIONNAIRE - PHQ9
1. LITTLE INTEREST OR PLEASURE IN DOING THINGS: 0
SUM OF ALL RESPONSES TO PHQ9 QUESTIONS 1 & 2: 0
SUM OF ALL RESPONSES TO PHQ QUESTIONS 1-9: 0
SUM OF ALL RESPONSES TO PHQ QUESTIONS 1-9: 0
2. FEELING DOWN, DEPRESSED OR HOPELESS: 0

## 2019-07-24 ASSESSMENT — ENCOUNTER SYMPTOMS
ABDOMINAL PAIN: 0
BACK PAIN: 1
BOWEL INCONTINENCE: 0

## 2019-07-24 NOTE — PATIENT INSTRUCTIONS
pressing to the floor and your hips and pelvis are slightly lifting off the floor. Hold for 6 seconds while breathing smoothly. ? Sit with your back flat against a wall. · Keep your core muscles strong. The muscles of your back, belly (abdomen), and buttocks support your spine. ? Pull in your belly and imagine pulling your navel toward your spine. Hold this for 6 seconds, then relax. Remember to keep breathing normally as you tense your muscles. ? Do curl-ups. Always do them with your knees bent. Keep your low back on the floor, and curl your shoulders toward your knees using a smooth, slow motion. Keep your arms folded across your chest. If this bothers your neck, try putting your hands behind your neck (not your head), with your elbows spread apart. ? Lie on your back with your knees bent and your feet flat on the floor. Tighten your belly muscles, and then push with your feet and raise your buttocks up a few inches. Hold this position 6 seconds as you continue to breathe normally, then lower yourself slowly to the floor. Repeat 8 to 12 times. ? If you like group exercise, try Pilates or yoga. These classes have poses that strengthen the core muscles. Lead a healthy lifestyle  · Stay at a healthy weight to avoid strain on your back. · Do not smoke. Smoking increases the risk of osteoporosis, which weakens the spine. If you need help quitting, talk to your doctor about stop-smoking programs and medicines. These can increase your chances of quitting for good. Where can you learn more? Go to https://citiservidale.Ziliko. org and sign in to your [employer] account. Enter L315 in the [employer] box to learn more about \"Learning About How to Have a Healthy Back. \"     If you do not have an account, please click on the \"Sign Up Now\" link. Current as of: September 20, 2018  Content Version: 12.0  © 7550-1088 Healthwise, Incorporated. Care instructions adapted under license by [facility] ([facility]).  If to make and go to all appointments, and call your doctor if you are having problems. It's also a good idea to know your test results and keep a list of the medicines you take. How can you care for yourself at home? · Sit or lie in positions that are most comfortable and that reduce your pain. Try one of these positions when you lie down:  ? Lie on your back with your knees bent and supported by large pillows. ? Lie on the floor with your legs on the seat of a sofa or chair. ? Lie on your side with your knees and hips bent and a pillow between your legs. ? Lie on your stomach if it does not make pain worse. · Do not sit up in bed, and avoid soft couches and twisted positions. Bed rest can help relieve pain at first, but it delays healing. Avoid bed rest after the first day. · Change positions every 30 minutes. If you must sit for long periods of time, take breaks from sitting. Get up and walk around, or lie flat. · Try using a heating pad on a low or medium setting, for 15 to 20 minutes every 2 or 3 hours. Try a warm shower in place of one session with the heating pad. You can also buy single-use heat wraps that last up to 8 hours. You can also try ice or cold packs on your back for 10 to 20 minutes at a time, several times a day. (Put a thin cloth between the ice pack and your skin.) This reduces pain and makes it easier to be active and exercise. · Take pain medicines exactly as directed. ? If the doctor gave you a prescription medicine for pain, take it as prescribed. ? If you are not taking a prescription pain medicine, ask your doctor if you can take an over-the-counter medicine. When should you call for help? Call 911 anytime you think you may need emergency care. For example, call if:    · You are unable to move a leg at all.     · You have back pain with severe belly pain.     · You have symptoms of a heart attack. These may include:  ?  Chest pain or pressure, or a strange feeling in the

## 2019-07-24 NOTE — PROGRESS NOTES
Chief Complaint   Patient presents with    Back Pain     x Sunday and worsening       HPI:     Flaco Peralta is a 43 y.o. male who presents today for complaints of Back Pain since Sunday that has gotten worse. He moved a couch and then took his children on vacation and slept on a different surface which he attributes to his back pain. Had back surgery 2012 which improved his back pain and has only had intermittent back ache since which resolves in a day or two until now. Back Pain   This is a recurrent problem. The current episode started in the past 7 days. The problem occurs constantly. The problem has been gradually worsening since onset. The pain is present in the lumbar spine (right sided lumbar). The quality of the pain is described as shooting and stabbing. The pain does not radiate. The pain is at a severity of 8/10. The pain is moderate. The pain is the same all the time. The symptoms are aggravated by bending, lying down, twisting and position. Pertinent negatives include no abdominal pain, bladder incontinence, bowel incontinence, chest pain, dysuria, fever, headaches, leg pain, numbness, paresis, paresthesias, pelvic pain, perianal numbness, tingling, weakness or weight loss. He has tried nothing for the symptoms. Subjective:     Review of Systems   Constitutional: Negative for chills, fatigue, fever and weight loss. Respiratory: Negative for cough, chest tightness and shortness of breath. Cardiovascular: Negative for chest pain, palpitations and leg swelling. Gastrointestinal: Negative for abdominal pain and bowel incontinence. Genitourinary: Negative for bladder incontinence, dysuria, flank pain, frequency and pelvic pain. Musculoskeletal: Positive for arthralgias, back pain and myalgias. Skin: Negative. Negative for rash. Neurological: Negative for dizziness, tingling, weakness, light-headedness, numbness, headaches and paresthesias.      Objective:     Vitals: 07/24/19 1411 07/24/19 1414   BP: (!) 146/100 (!) 132/94   Site: Right Upper Arm Left Upper Arm   Position: Sitting Sitting   Cuff Size: Medium Adult Medium Adult   Pulse: 86    SpO2: 97%    Weight: 224 lb (101.6 kg)        Physical Exam   Constitutional: He appears well-developed and well-nourished. Neck: Normal range of motion. Neck supple. Cardiovascular: Normal rate, regular rhythm, normal heart sounds and intact distal pulses. Pulmonary/Chest: Effort normal and breath sounds normal. No respiratory distress. Abdominal: Soft. Bowel sounds are normal. He exhibits no distension. There is no tenderness. Musculoskeletal:        Lumbar back: He exhibits decreased range of motion, tenderness, swelling, pain and spasm. He exhibits no bony tenderness, no deformity, no laceration and normal pulse. Skin: Skin is warm and dry. Capillary refill takes less than 2 seconds. No rash noted. Nursing note and vitals reviewed. Assessment & Plan: The following diagnoses and conditions are stable with no further orders unlessindicated:    1. Lumbar pain        Kady Azevedo was seen today for back pain. Diagnoses and all orders for this visit:    Lumbar pain  -     tiZANidine (ZANAFLEX) 4 MG tablet; Take 1 tablet by mouth 3 times daily  -     predniSONE (DELTASONE) 20 MG tablet; Take 1 tablet by mouth 2 times daily for 5 days  -     ketorolac (TORADOL) injection 30 mg  For acute pain, rest, intermittent application of cold packs, analgesics and muscle relaxants are recommended. Discussed longer term treatment plan of prn NSAID's and discussed a home back care exercise program with flexion exercise routine. Proper lifting with avoidance of heavy lifting discussed. Consider Physical Therapy and XRay studies if not improving. Call or return to clinic prn if these symptoms worsen or fail to improve as anticipated.   - Stretching exercises discussed with patient, materials distributed  - Avoidance of exacerbating activities

## 2019-07-25 ASSESSMENT — ENCOUNTER SYMPTOMS
SHORTNESS OF BREATH: 0
CHEST TIGHTNESS: 0
COUGH: 0

## 2020-09-23 ENCOUNTER — OFFICE VISIT (OUTPATIENT)
Dept: PRIMARY CARE CLINIC | Age: 40
End: 2020-09-23
Payer: COMMERCIAL

## 2020-09-23 ENCOUNTER — HOSPITAL ENCOUNTER (OUTPATIENT)
Age: 40
Setting detail: SPECIMEN
Discharge: HOME | End: 2020-09-23
Payer: COMMERCIAL

## 2020-09-23 VITALS — HEART RATE: 89 BPM | TEMPERATURE: 98 F | OXYGEN SATURATION: 98 %

## 2020-09-23 PROCEDURE — 99213 OFFICE O/P EST LOW 20 MIN: CPT | Performed by: FAMILY MEDICINE

## 2020-09-23 PROCEDURE — U0002 COVID-19 LAB TEST NON-CDC: HCPCS

## 2020-09-23 NOTE — PATIENT INSTRUCTIONS
Your COVID 19 test can take 3-5 days for the results come back. We ask that you make a Mychart page and view your test results this way. You will need to Self quarantine until you know your results. Increase fluids rest  Saline nasal spray as directed  Warm salt gargles for throat discomfort  Monitor temperature twice a day  Tylenol for fevers and/or discomfort. If symptoms are worse -Go to the ER. Follow up with your primary doctor    To Whom it May Concern:    Royer Araiza has been tested for COVID on 09/23/20. They may NOT return to work until their lab test results back and they been fever free for 3 days. If test is positive they must stay home for 2 weeks or until they test negative or as directed by the [facility] Department.

## 2020-09-23 NOTE — PROGRESS NOTES
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 C/o extreme fatigue and sleeping all day over last weekend. 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    [] Fevers  [x] Chills  [x] Cough  [] Coughing up blood  [x] Chest Congestion  [x] Nasal Congestion  [] Feeling short of breath  [] Sometimes  [] Frequently  [] All the time  [] Chest pain  [x] Headaches  []Tolerable  [x] Severe  [] Sore throat  [] Muscle aches  [x] Nausea  [] Vomiting  []Unable to keep fluids down  [x] Diarrhea  []Severe    [x] OTHER SYMPTOMS: FATIGUE      Symptom Duration:   [] 1  [] 2   [] 3   [] 4    [x] 5   [] 6   [] 7   [] 8   [] 9   [] 10   [] 11   [] 12   [] 13   [] 14   [] Longer than 14 days    Symptom course:   [x] Worsening     [] Stable     [] Improving    RISK FACTORS:    [] Pregnant or possibly pregnant  [] Age over 61  [] Diabetes  [] Heart disease  [] Asthma  [] COPD/Other chronic lung diseases  [] Active Cancer  [] On Chemotherapy  [] Taking oral steroids  [] History Lymphoma/Leukemia  [] Close contact with a lab confirmed COVID-19 patient within 14 days of symptom onset  [] History of travel from affected geographical areas within 14 days of symptom onset       VITALS:  Vitals:    09/23/20 1118   Pulse: 89   Temp: 98 °F (36.7 °C)   TempSrc: Temporal   SpO2: 98%      Physical Exam  Vitals signs and nursing note reviewed. Constitutional:       General: He is not in acute distress. Appearance: He is well-developed. He is ill-appearing (malaise). He is not diaphoretic. HENT:      Head: Normocephalic and atraumatic.       Right Ear: Hearing, tympanic membrane, ear canal and external ear normal.      Left Ear: Hearing, tympanic membrane, ear canal and external ear normal. Nose: Mucosal edema present. Mouth/Throat:      Pharynx: Posterior oropharyngeal erythema present. No oropharyngeal exudate. Cardiovascular:      Rate and Rhythm: Normal rate and regular rhythm. Heart sounds: Normal heart sounds. No murmur. Pulmonary:      Effort: Pulmonary effort is normal. No respiratory distress. Breath sounds: Normal breath sounds. No wheezing or rales. Skin:     General: Skin is warm and dry. Findings: No erythema or rash. Neurological:      Mental Status: He is alert and oriented to person, place, and time. TESTS:    POCT FLU:  [] Positive     []Negative    ASSESSMENT:    [] Flu  [x] Possible COVID-19--viral bronchitis[] Strep    PLAN:    [x] Discharge home with written instructions for:  [] Flu management  [x] Possible COVID-19 infection with self-quarantine and management of symptoms  [x] Follow-up with primary care physician or emergency department if worsens  [] Evaluation per physician/nurse practitioner in clinic  [] Sent to ER       An  electronic signature was used to authenticate this note.      --Dotty Dave MD on 9/23/2020 at 11:37 AM

## 2020-09-24 ENCOUNTER — CARE COORDINATION (OUTPATIENT)
Dept: CARE COORDINATION | Age: 40
End: 2020-09-24

## 2020-09-24 LAB
SARS-COV-2: NOT DETECTED
SOURCE: NORMAL

## 2020-09-24 NOTE — CARE COORDINATION
RN responded to a yellow alert sent over the get well loop for symptom check on day 1. I attempted to call and left a voicemail. I also sent a message back over the loop. Catracho Mcpherson RN, 9:49 AM  Good morning Sonido Larios and thank you for checking in with the loop team. I tried to call and left you a voicemail. Please let us know if you have any new or worsening symptoms since your clinic visit. If you would like to speak to a nurse, we are available M-F 8-4 and 9-1 weekends. I can be reached today at [phone], Catracho rivers, 9:51 AM I just got your message. Thank you. So I still havent received any results from my COVID test yesterday. Was my test positive or is all this loop stuff because I had symptoms? Catracho Mcpherson RN, 9:55 AM  I just checked and your test results haven't come back yet. You were signed up for the get well loop just to give you some support while you wait for results, or to offer advice if you need while trying to deal with symptoms, or support if you are positive for COVID 19. Any day that you check in with us we will be happy to send you a number for a nurse if you would like to speak to one.  Mike gN

## 2020-09-29 ENCOUNTER — OFFICE VISIT (OUTPATIENT)
Dept: PRIMARY CARE CLINIC | Age: 40
End: 2020-09-29
Payer: COMMERCIAL

## 2020-09-29 ENCOUNTER — HOSPITAL ENCOUNTER (OUTPATIENT)
Age: 40
Setting detail: SPECIMEN
Discharge: HOME | End: 2020-09-29
Payer: COMMERCIAL

## 2020-09-29 VITALS — HEART RATE: 123 BPM | TEMPERATURE: 96.3 F | OXYGEN SATURATION: 97 %

## 2020-09-29 PROCEDURE — U0002 COVID-19 LAB TEST NON-CDC: HCPCS

## 2020-09-29 PROCEDURE — 99211 OFF/OP EST MAY X REQ PHY/QHP: CPT | Performed by: INTERNAL MEDICINE

## 2020-10-01 LAB
SARS-COV-2: NOT DETECTED
SOURCE: NORMAL

## 2022-12-29 ENCOUNTER — HOSPITAL ENCOUNTER (OUTPATIENT)
Dept: PHYSICAL THERAPY | Age: 42
Setting detail: THERAPIES SERIES
Discharge: HOME | End: 2022-12-29
Payer: OTHER GOVERNMENT

## 2022-12-29 PROCEDURE — 97161 PT EVAL LOW COMPLEX 20 MIN: CPT

## 2022-12-29 PROCEDURE — 97110 THERAPEUTIC EXERCISES: CPT

## 2022-12-29 ASSESSMENT — PAIN DESCRIPTION - DESCRIPTORS: DESCRIPTORS: ACHING;DULL

## 2022-12-29 ASSESSMENT — PAIN DESCRIPTION - FREQUENCY: FREQUENCY: CONTINUOUS

## 2022-12-29 ASSESSMENT — PAIN DESCRIPTION - ONSET: ONSET: GRADUAL

## 2022-12-29 ASSESSMENT — PAIN SCALES - GENERAL: PAINLEVEL_OUTOF10: 0

## 2022-12-29 ASSESSMENT — PAIN DESCRIPTION - LOCATION: LOCATION: SHOULDER

## 2022-12-29 ASSESSMENT — PAIN - FUNCTIONAL ASSESSMENT: PAIN_FUNCTIONAL_ASSESSMENT: PREVENTS OR INTERFERES SOME ACTIVE ACTIVITIES AND ADLS

## 2022-12-29 ASSESSMENT — PAIN DESCRIPTION - ORIENTATION: ORIENTATION: LEFT;ANTERIOR

## 2022-12-29 ASSESSMENT — PAIN DESCRIPTION - PAIN TYPE: TYPE: SURGICAL PAIN

## 2022-12-29 NOTE — PLAN OF CARE
[address] PHYSICAL THERAPY  [facility] [address], [address] [address]  Dept: [phone]  Dept Fax: [phone]  Loc: [phone]    PHYSICAL THERAPY PLAN OF CARE: INITIAL EVALUATION    Patient: Elvie Riedel (58 y.o. male)   Examination Date: 6886  Plan of Care Certification Period: 2022 to        :  1980  MRN: [id number]  CSN: [id number]   Insurance: Payor: Ole Duarte / Plan: Ole Duarte / Product Type: *No Product type* /   Insurance ID: [id number] - ([facility]) Secondary Insurance (if applicable):    Referring Physician: Camila Glasgow     PCP: NICKOLAS García Visits to Date/Visits Approved:   /      No Show/Cancelled Appts:   /       Medical Diagnosis: Pain in left shoulder [M25.512]    Treatment Diagnosis: L UE capsular stiffness, reduced A/PROM, pain and weakness       ASSESSMENT     Impression:  Pt is 36year old male s/p L total shoulder replacement 22. Pt now has difficulties completing lifting, sleeping on shoulder and work duties. Pt demo deficits this date that include L UE A/PROM, capsular stiffness and min pain. Pt will benefit with PT services with progression of strength/ROM, manual and modalities to return to PLOF. Pt prior to onset of current condition had min/no pain with able to complete full ADLs and work activities. Patient received education on their current pathology and how their condition effects them with their functional activities. Patient understood discussion and questions were answered. Patient understands their activity limitations and understands rational for treatment progression.      Body Structures, Functions, Activity Limitations Requiring Skilled Therapeutic Intervention: Decreased functional mobility , Decreased ROM, Decreased strength, Decreased endurance, Decreased balance    Statement of Medical Necessity: Physical Therapy is both indicated and medically necessary as outlined in the POC to increase the likelihood of meeting the functionally related goals stated below. Patient's Activity Tolerance: Patient tolerated evaluation without incident      Patient's rehabilitation potential/prognosis is considered to be: Good    Factors which may impact rehabilitation potential include: None  Measures taken to address barrier(s): N/A     GOALS   Patient Goal(s): improve the strength and ROM  Short Term Goals Completed by Defer to Long Term Goals Goal Status                                                                   Long Term Goals Completed by 8 weeks   3/1/22 Goal Status   Pt will demo I with HEP/symptom management. Pt will demo >15 deg improvement in L UE A/PROM shoulder flex/ABD/ER to ease overhead reaching. Pt will demo <20% disability per Quick DASH to demo improved function. Pt will demo able to functionally reach and lifting 3# with arm extended in all directions without pain to ease painting. Pt will demo >4+/5 L UE strength in all direction with min/no pain to ease heavy lifting. TREATMENT PLAN       Requires PT Follow-Up: Yes  Specific Instructions for Next Treatment: review HEP, manual to end ranges without heavy overpressure, RTC/deltoid strengthening, scapular strengthening, modalities PRN.     Pt. actively involved in establishing Plan of Care and Goals: Yes  Patient/ Caregiver education and instruction: Goals, PT Role, Plan of Care, Evaluative findings, Home Exercise Program, Insurance             Treatment may include any combination of the following: Current Treatment Recommendations: ROM, Strengthening, Neuromuscular re-education, Therapeutic activities, Home exercise program     Frequency / Duration:  Patient to be seen 2 for 8 weeks       Patient Status: [x] Continue / Initiate Plan of Care     Signature: Electronically signed by Lorenzo Barnard, PT, DPT, OCS on 12/29/2022 at 3:28 PM.    12/29/2022 3:28 PM Physician Signature:______________________ Date:______ Time: ________  By signing above, therapists plan is approved by physician   If you have any questions or concerns, please don't hesitate to call.   Thank you for your referral!

## 2022-12-29 NOTE — FLOWSHEET NOTE
Outpatient Physical Therapy  [place]           [x] Phone: [phone]   Fax: [phone]  [facility] Mar[facility]           [] Phone: [phone]   Fax: [phone]        Physical Therapy Daily Treatment Note  Date:  2022    Patient Name:  Itz Bull    :  1980  MRN: [id number]  Restrictions/Precautions: no active resisted IR, to tolerance with ROM with PROM. Diagnosis:   Pain in left shoulder [M25.512]   L total shoulder replacement. Date of Injury/Surgery:   Treatment Diagnosis:  L UE capsular stiffness, reduced A/PROM, pain and weakness  Insurance/Certification information:  [place] 15 visits approved   Referring Physician:  NICKOLAS Moreira     PCP: NICKOLAS Barrett  Next Doctor Visit:    Plan of care signed (Y/N):  N, sent 22   Outcome Measure: QD: 39% disability   Visit# / total visits:   1/15  Pain level: 0/10   Goals:     Patient goals: improve the strength and ROM  Short term goals  Time Frame for Short term goals: Defer to [place] [address]  Time Frame for Long Term Goals: 8 weeks   3/1/22  Pt will demo I with HEP/symptom management. Pt will demo >15 deg improvement in L UE A/PROM shoulder flex/ABD/ER to ease overhead reaching. Pt will demo <20% disability per Quick DASH to demo improved function. Pt will demo able to functionally reach and lifting 3# with arm extended in all directions without pain to ease painting. Pt will demo >4+/5 L UE strength in all direction with min/no pain to ease heavy lifting. Summary of Evaluation:   Pt is 36year old male s/p L total shoulder replacement 22. Pt now has difficulties completing lifting, sleeping on shoulder and work duties. Pt demo deficits this date that include L UE A/PROM, capsular stiffness and min pain. Pt will benefit with PT services with progression of strength/ROM, manual and modalities to return to PLOF.  Pt prior to onset of current condition had min/no pain with able to complete full ADLs and work activities. Patient received education on their current pathology and how their condition effects them with their functional activities. Patient understood discussion and questions were answered. Patient understands their activity limitations and understands rational for treatment progression. Subjective:  See eval         Any changes in Ambulatory Summary Sheet? None        Objective:  See eval           Exercises: (No more than 4 columns)   Exercise/Equipment 12/29/22  #1 Date Date           WARM UP         Pulley       UBE                              TABLE      Taffy pull  GTB 10x2     Resisted punches  GTB or supine with 5# 10x2     Table flexion slides with stool  10x2  3\"     Supine AAROM flexion with cane to end range with light resistance                                        STANDING      Mid rows  Discussed      PROM shoulder ABD with cane  10x2 3\"     TRX shoulder flexion stretch       ER with cane in neutral to end range with overpressure                              PROPRIOCEPTION                                    MODALITIES                      Other Therapeutic Activities/Education:  Patient received education on their current pathology and how their condition effects them with their functional activities. Patient understood discussion and questions were answered. Patient understands their activity limitations and understands rational for treatment progression. Home Exercise Program:  HO issued, reviewed and discussed with patient. Pt agreed to comply. Manual Treatments:  manual PROM in all directions with light OP at end ranges. Modalities:  --      Communication with other providers:  POC sent 12/29/22       Assessment:  (Response towards treatment session) (Pain Rating)     Pt is 36year old male s/p L total shoulder replacement 11/22/22. Pt now has difficulties completing lifting, sleeping on shoulder and work duties.  Pt demo deficits this date that include L UE A/PROM, capsular stiffness and min pain. Pt will benefit with PT services with progression of strength/ROM, manual and modalities to return to PLOF. Pt prior to onset of current condition had min/no pain with able to complete full ADLs and work activities. Patient received education on their current pathology and how their condition effects them with their functional activities. Patient understood discussion and questions were answered. Patient understands their activity limitations and understands rational for treatment progression. Plan for Next Session: review HEP, manual to end ranges without heavy overpressure, RTC/deltoid strengthening, scapular strengthening, modalities PRN.        Time In / Time Out:    6156-0120           Timed Code/Total Treatment Minutes:  14/45'  14' TE, 1 PT jackson       Next Progress Note due:  Jackson 12/29/22   Visit 10       Plan of Care Interventions:  [x] Therapeutic Exercise  [x] Modalities:  [x] Therapeutic Activity     [] Ultrasound  [] Estim  [] Gait Training      [] Cervical Traction [] Lumbar Traction  [x] Neuromuscular Re-education    [x] Cold/hotpack [] Iontophoresis   [x] Instruction in HEP      [x] Vasopneumatic   [] Dry Needling    [x] Manual Therapy               [] Aquatic Therapy              Electronically signed by:  Hardeep Salomon, PT, DPT, OCS  12/29/2022, 7:13 AM    12/29/2022 7:13 AM

## 2022-12-29 NOTE — PROGRESS NOTES
Physical Therapy: Initial Evaluation    Patient: Earlie Hamman (72 y.o. male)   Examination Date:   Plan of Care Certification Period: 2022 to        :  1980 ;    Confirmed: Yes MRN: [id number]  CSN: [id number]   Insurance: Payor: [employer] / Plan: Raúl Bucco / Product Type: *No Product type* /   Insurance ID: [id number] - ([facility]) Secondary Insurance (if applicable):    Referring Physician: Camila Oakes     PCP: NICKOLAS Damon Visits to Date/Visits Approved:   /      No Show/Cancelled Appts:   /       Medical Diagnosis: Pain in left shoulder [M25.512]    Treatment Diagnosis: L UE capsular stiffness, reduced A/PROM, pain and weakness     PERTINENT MEDICAL HISTORY   Patient Assessed for Rehabilitation Services: Yes       Medical History: Chart Reviewed: Yes   Past Medical History:   Diagnosis Date    ADD (attention deficit disorder)     Arthritis     \"Left Shoulder, Left Knee\"    Chronic back pain     Teeth missing     Upper And Lower    [place] teeth extracted     4 [place] Teeth Extracted In Past     Surgical History:   Past Surgical History:   Procedure Laterality Date    BACK SURGERY      \"Laminectomy, L3 L4\" , No Hardware    COLONOSCOPY  's    KNEE ARTHROSCOPY Left     KNEE ARTHROSCOPY Left     KNEE ARTHROSCOPY Left 10/22/2018    KNEE ARTHROSCOPY LEFT, PARTIAL MEDIAL MENISCECTOMY, CHONDROPLASTY performed by Mamta Burciaga MD at [address] [place] [address] ARTHROSCOPY Left  Or 2014    SHOULDER SURGERY Left     WISDOM TOOTH EXTRACTION      4 [place] Teeth Extracted In Past       Medications:   Current Outpatient Medications:     tiZANidine (ZANAFLEX) 4 MG tablet, Take 1 tablet by mouth 3 times daily, Disp: 30 tablet, Rfl: 0    amphetamine-dextroamphetamine (ADDERALL XR) 30 MG extended release capsule, Take 30 mg by mouth 2 times daily. ., Disp: , Rfl:     Aspirin-Acetaminophen-Caffeine (EXCEDRIN PO), Take by mouth as needed Over The Counter, Disp: , Rfl: Allergies: Patient has no known allergies. SUBJECTIVE EXAMINATION      ,           Subjective History:    Subjective: L shoulder pain for years with reconsturction in 20 years ago. 2 surgeries since with scope and bone spur removal with tempoary improvement. Weak and painful. Decided best to have TSA. DOS: 11/22/22. Returned back one time since surgery. Return follow up in 1-2 month. Completing HEP. No longer using sling but not lifting. Temporary able to lay on shoulder. Pt is R handed. Wanting to return to full ROM. Denies N/T. Min use of NSAIDS.   Additional Pertinent Hx (if applicable):     Prior diagnostic testing[de-identified] X-ray, MRI      Learning/Language: Learning  Does the patient/guardian have any barriers to learning?: No barriers  Will there be a co-learner?: No  What is the preferred language of the patient/guardian?: English  Is an  required?: No  How does the patient/guardian prefer to learn new concepts?: Listening, Demonstration     Pain Screening   Pain Screening  Patient Currently in Pain: Yes  Pain Assessment: 0-10  Pain Level: 0  Best Pain Level: 0  Worst Pain Level: 8  Pain Type: Surgical pain  Pain Location: Shoulder  Pain Orientation: Left, Anterior  Pain Descriptors: Aching, Dull  Pain Frequency: Continuous  Pain Onset: Gradual  Functional Pain Assessment: Prevents or interferes some active activities and ADLs  Aggravating factors: Lifting, Carrying, Sleeping, Laying on involved side, Work duties  Pain Management/Relieving Factors: Rest, Ice    Functional Status    Dominant Hand: : Right    Social History:  Social History  Lives With: Family    Occupation/Interests:  Occupation: Full time employment  Type of Occupation: teacher and   Leisure & Hobbies: lift weights    Prior Level of Function:    Independent        Current Level of Function:         ADL Assistance: Independent  Homemaking Assistance: Independent  Ambulation Assistance: Independent  Transfer Assistance: Independent  Active : Yes  Mode of Transportation: Car    OBJECTIVE EXAMINATION   Restrictions:             Review of Systems:  Vision: Within Functional Limits  Hearing: Within functional limits  Overall Orientation Status: Within Normal Limits  Follows Commands: Within Functional Limits      Observations:   No sling, no distress or physical deformities. Palpation:   Left Shoulder Palpation: Denies pain with palpation. Neuro Screen:  WNL  Left AROM  Right AROM         AROM LUE (degrees)  L Shoulder Flexion (0-180): 110 with blocking at end range  L Shoulder Extension (0-45): 62  L Shoulder ABduction (0-180): 114 deg with block sensation  L Shoulder Int Rotation  (0-70): functional to L PSIS  L Shoulder Ext Rotation  (0-90): 30 deg in neutral, functional to C6  L Shoulder Horiz ADduction (0-120): WNL     WFL     Left PROM  Right PROM         PROM LUE (degrees)  L Shoulder Flex  (0-180): 140  L Shoulder Ext  (0-45): WNL  L Shoulder ABduction (0-180): 140  L Shoulder Int Rotation  (0-70): WNL  L Shoulder Ext Rotation  (0-90): 70     WFL     Left Strength  Right Strength      General Strength Testing UE: Right WNL (L iso strength in neutral: poor IR* pain limiting , Good (-) all other dir       R: 5/5 in all dir with min pain in most dir.)    General Strength Testing UE: Right WNL (L iso strength in neutral: poor IR* pain limiting , Good (-) all other dir       R: 5/5 in all dir with min pain in most dir.)          Additional Finding(s) (if applicable): Quick DASH: 39% disability        ASSESSMENT     Impression:  Pt is 36year old male s/p L total shoulder replacement 11/22/22. Pt now has difficulties completing lifting, sleeping on shoulder and work duties. Pt demo deficits this date that include L UE A/PROM, capsular stiffness and min pain. Pt will benefit with PT services with progression of strength/ROM, manual and modalities to return to PLOF.  Pt prior to onset of current condition had min/no pain with able to complete full ADLs and work activities. Patient received education on their current pathology and how their condition effects them with their functional activities. Patient understood discussion and questions were answered. Patient understands their activity limitations and understands rational for treatment progression. Body Structures, Functions, Activity Limitations Requiring Skilled Therapeutic Intervention: Decreased functional mobility , Decreased ROM, Decreased strength, Decreased endurance, Decreased balance    Statement of Medical Necessity: Physical Therapy is both indicated and medically necessary as outlined in the POC to increase the likelihood of meeting the functionally related goals stated below. Patient's Activity Tolerance: Patient tolerated evaluation without incident      Patient's rehabilitation potential/prognosis is considered to be: Good    Factors which may impact rehabilitation potential include: None  Measures taken to address barrier(s): N/A     GOALS   Patient Goal(s): improve the strength and ROM  Short Term Goals Completed by Defer to Long Term Goals Goal Status                                                                   Long Term Goals Completed by 8 weeks   3/1/22 Goal Status   Pt will demo I with HEP/symptom management. Pt will demo >15 deg improvement in L UE A/PROM shoulder flex/ABD/ER to ease overhead reaching. Pt will demo <20% disability per Quick DASH to demo improved function. Pt will demo able to functionally reach and lifting 3# with arm extended in all directions without pain to ease painting. Pt will demo >4+/5 L UE strength in all direction with min/no pain to ease heavy lifting.                                         TREATMENT PLAN       Requires PT Follow-Up: Yes  Specific Instructions for Next Treatment: review HEP, manual to end ranges without heavy overpressure, RTC/deltoid strengthening, scapular strengthening, modalities PRN. Pt. actively involved in establishing Plan of Care and Goals: Yes  Patient/ Caregiver education and instruction: Goals, PT Role, Plan of Care, Evaluative findings, Home Exercise Program, Insurance             Treatment may include any combination of the following: Current Treatment Recommendations: ROM, Strengthening, Neuromuscular re-education, Therapeutic activities, Home exercise program     Frequency / Duration:  Patient to be seen 2 for 8 weeks      Eval Complexity: Overall Evaluation : Low  Decision Making: Low Complexity  History: Personal Factors and/or Comorbidities Impacting POC: Low  Examination of body system(s) including body structures and functions, activity limitations, and/or participation restrictions: Low  Clinical Presentation: Low         Therapist Signature: Kirt Toney PT, DPT ,OCS     Date: 12/29/2022     88/57/3472 9:91 PM   I certify that the above Therapy Services are being furnished while the patient is under my care. I agree with the treatment plan and certify that this therapy is necessary. [de-identified] Signature:  ___________________________   Date:_______                                                                   NICKOLAS Gong        Physician Comments: _______________________________________________    Please sign and return to [id number]  [facility]. Please fax to the location listed below.  Debbi Thomas for this referral!    [address] [address], [address] [address]  Dept: [phone]  Dept Fax: [phone]  Loc: [phone]       POC NOTE

## 2023-01-04 ENCOUNTER — HOSPITAL ENCOUNTER (OUTPATIENT)
Dept: PHYSICAL THERAPY | Age: 43
Setting detail: THERAPIES SERIES
Discharge: HOME | End: 2023-01-04
Payer: OTHER GOVERNMENT

## 2023-01-04 PROCEDURE — 97110 THERAPEUTIC EXERCISES: CPT

## 2023-01-04 PROCEDURE — 97140 MANUAL THERAPY 1/> REGIONS: CPT

## 2023-01-04 NOTE — FLOWSHEET NOTE
Outpatient Physical Therapy  [place]           [x] Phone: [phone]   Fax: [phone]  [facility]           [] Phone: [phone]   Fax: [phone]        Physical Therapy Daily Treatment Note  Date:  2023    Patient Name:  Eladia Del Real    :  1980  MRN: [id number]  Restrictions/Precautions: no active resisted IR, to tolerance with ROM with PROM. Diagnosis:   Pain in left shoulder [M25.512]   L total shoulder replacement. Date of Injury/Surgery: 2022  Treatment Diagnosis:  L UE capsular stiffness, reduced A/PROM, pain and weakness  Insurance/Certification information:  [place] 15 visits approved   Referring Physician:  NICKOLAS Whyte     PCP: NICKOLAS Vo  Next Doctor Visit:    Plan of care signed (Y/N):  N, sent 22   Outcome Measure: QD: 39% disability   Visit# / total visits:   2/15  Pain level: 0/10       Goals:     Patient goals: improve the strength and ROM  Short term goals  Time Frame for Short term goals: Defer to [address] Term Goals  Time Frame for Long Term Goals: 8 weeks   3/1/22  Pt will demo I with HEP/symptom management. Reports compliance   Pt will demo >15 deg improvement in L UE A/PROM shoulder flex/ABD/ER to ease overhead reaching. Pt will demo <20% disability per Quick DASH to demo improved function. Pt will demo able to functionally reach and lifting 3# with arm extended in all directions without pain to ease painting. Pt will demo >4+/5 L UE strength in all direction with min/no pain to ease heavy lifting. Summary of Evaluation:   Pt is 36year old male s/p L total shoulder replacement 22. Pt now has difficulties completing lifting, sleeping on shoulder and work duties. Pt demo deficits this date that include L UE A/PROM, capsular stiffness and min pain. Pt will benefit with PT services with progression of strength/ROM, manual and modalities to return to PLOF.  Pt prior to onset of current condition had min/no pain with able to complete full ADLs and work activities. Patient received education on their current pathology and how their condition effects them with their functional activities. Patient understood discussion and questions were answered. Patient understands their activity limitations and understands rational for treatment progression. Subjective:  Rolanda Ramsey arrives to therapy stating that there is currently no pain in the shoulder, just has pain with certain movements. Any changes in Ambulatory Summary Sheet? None        Objective:   COVID screening questions were asked and patient attested that there had been no contact or symptoms            Exercises: (No more than 4 columns)   Exercise/Equipment 12/29/22  #1 1/4/2023 #2 Date           WARM UP      Pulley   15* flex    UBE  2'/2' @ 120                            TABLE      Taffy pull  GTB 10x2 GTB 2*10    Resisted punches  GTB or supine with 5# 10x2 5# 2*10 supine     Table flexion slides with stool  10x2  3\" Counter top 2*10 3\"     Supine AAROM flexion with cane to end range with light resistance   2# bar 2*10                                     STANDING      Mid rows  Discussed  --    PROM shoulder ABD with cane  10x2 3\" 2*10 3\"     TRX shoulder flexion stretch   10*5\"    ER with cane in neutral to end range with overpressure   2*10 3\"                            PROPRIOCEPTION                                    MODALITIES                      Other Therapeutic Activities/Education:  none       Home Exercise Program:  HO issued, reviewed and discussed with patient. Pt agreed to comply. Manual Treatments:  PROM flex/scapt/ER to tolerance        Modalities:  --      Communication with other providers:  POC sent 12/29/22       Assessment:   Jc tolerated today's session well, including additional exercises. He is stiff end ranges in all planes of movement but good ROM for stage of healing.  End session pain: same - looser     Plan for Next Session: review HEP, manual to end ranges without heavy overpressure, RTC/deltoid strengthening, scapular strengthening, modalities PRN.        Time In / Time Out:   9240/6100           Timed Code/Total Treatment Minutes:  35 1 man 1 TE       Next Progress Note due:  Daryal 12/29/22   Visit 10       Plan of Care Interventions:  [x] Therapeutic Exercise  [x] Modalities:  [x] Therapeutic Activity     [] Ultrasound  [] Estim  [] Gait Training      [] Cervical Traction [] Lumbar Traction  [x] Neuromuscular Re-education    [x] Cold/hotpack [] Iontophoresis   [x] Instruction in HEP      [x] Vasopneumatic   [] Dry Needling    [x] Manual Therapy               [] Aquatic Therapy              Electronically signed by:  Darin Gilmore    10:32 AM  1/4/2023

## 2023-01-09 ENCOUNTER — HOSPITAL ENCOUNTER (OUTPATIENT)
Dept: PHYSICAL THERAPY | Age: 43
Discharge: HOME | End: 2023-01-09

## 2023-01-09 NOTE — FLOWSHEET NOTE
Physical Therapy  Cancellation/No-show Note  Patient Name:  Vladislav Oro  :  1980   Date:  2023  Cancelled visits to date: 1  No-shows to date: 0    For today's appointment patient:  [x]  Cancelled  []  Rescheduled appointment  []  No-show     Reason given by patient:  []  Patient ill  []  Conflicting appointment  []  No transportation    [x]  Conflict with work  []  No reason given  []  Other:     Comments:      Electronically signed by:  Cathi Waldrop PTA    3:51 PM  2023

## 2023-01-17 ENCOUNTER — HOSPITAL ENCOUNTER (OUTPATIENT)
Dept: PHYSICAL THERAPY | Age: 43
Setting detail: THERAPIES SERIES
Discharge: HOME | End: 2023-01-17
Payer: OTHER GOVERNMENT

## 2023-01-17 PROCEDURE — 97530 THERAPEUTIC ACTIVITIES: CPT

## 2023-01-17 PROCEDURE — 97110 THERAPEUTIC EXERCISES: CPT

## 2023-01-17 NOTE — FLOWSHEET NOTE
Outpatient Physical Therapy  [place]           [x] Phone: [phone]   Fax: [phone]  [place]           [] Phone: [phone]   Fax: [phone]        Physical Therapy Daily Treatment Note  Date:  2023    Patient Name:  Sina Campbell    :  1980  MRN: [id number]  Restrictions/Precautions: no active resisted IR, to tolerance with ROM with PROM. Diagnosis:   Pain in left shoulder [M25.512]   L total shoulder replacement. Date of Injury/Surgery: 2022  Treatment Diagnosis:  L UE capsular stiffness, reduced A/PROM, pain and weakness  Insurance/Certification information:  [place] 15 visits approved   Referring Physician:  NICKOLAS Peres Cea     PCP: NICKOLAS Thurston  Next Doctor Visit:    Plan of care signed (Y/N):  N, sent 22   Outcome Measure: QD: 39% disability   Visit# / total visits:  3/15  Pain level: 0 /10       Goals:     Patient goals: improve the strength and ROM  Short term goals  Time Frame for Short term goals: Defer to [address] Term Goals  Time Frame for Long Term Goals: 8 weeks   3/1/22  Pt will demo I with HEP/symptom management. Reports compliance   Pt will demo >15 deg improvement in L UE A/PROM shoulder flex/ABD/ER to ease overhead reaching. Pt will demo <20% disability per Quick DASH to demo improved function. Pt will demo able to functionally reach and lifting 3# with arm extended in all directions without pain to ease painting. Pt will demo >4+/5 L UE strength in all direction with min/no pain to ease heavy lifting. Summary of Evaluation:   Pt is 36year old male s/p L total shoulder replacement 22. Pt now has difficulties completing lifting, sleeping on shoulder and work duties. Pt demo deficits this date that include L UE A/PROM, capsular stiffness and min pain. Pt will benefit with PT services with progression of strength/ROM, manual and modalities to return to PLOF.  Pt prior to onset of current condition had min/no pain with able to complete full ADLs and work activities. Patient received education on their current pathology and how their condition effects them with their functional activities. Patient understood discussion and questions were answered. Patient understands their activity limitations and understands rational for treatment progression. Subjective:  Marianna Levine arrives to therapy stating that there is currently no pain in the shoulder, just has pain with certain movements when waking in the morning. Any changes in Ambulatory Summary Sheet? None        Objective:   COVID screening questions were asked and patient attested that there had been no contact or symptoms    Tendency to with scapular elevation compensation with end range flexion. Good technique with strengthening exercises. Cramp sensation with light IR resistance.          Exercises: (No more than 4 columns)   Exercise/Equipment 12/29/22  #1 1/4/2023 #2 1/17/23  #3           WARM UP      Pulley   15* flex    UBE  2'/2' @ 120 1.5/1.5' @ 76 RPM                            TABLE      Taffy pull  GTB 10x2 GTB 2*10    Resisted punches  GTB or supine with 5# 10x2 5# 2*10 supine  Standing 10x2 GTB   Table flexion slides with stool  10x2  3\" Counter top 2*10 3\"     Supine AAROM flexion with cane to end range with light resistance   2# bar 2*10                                     STANDING      Mid rows  Discussed  -- BTB 10x2   PROM shoulder ABD with cane  10x2 3\" 2*10 3\"     TRX shoulder flexion stretch   10*5\" 10x  3\"   ER with cane in neutral to end range with overpressure   2*10 3\"     TRX pulls    10x2   Roller on wall    10x2  3\"       ABD off tall table      3# 10x2   Pulldowns    RTB 10x2   Resisted IR    YTB 10x2   ER walkouts    GTB 5x2   statue of liberty    3# 1 lap                PROPRIOCEPTION      Ball throw into rebounder with overhead reach    2# 10x2   Bodyblade    20\"x3 in neutral                      MODALITIES Other Therapeutic Activities/Education:  none       Home Exercise Program:  HO issued, reviewed and discussed with patient. Pt agreed to comply. Manual Treatments:        Modalities:  --      Communication with other providers:  POC sent 12/29/22       Assessment:   Jc tolerated today's session well, including additional exercises. Cramp into L pec region with IR activation. Min cues to reduce scapular elevation. Min stiffness at end range shoulder but very functional with improving strength/tolerance. Will assess and progress next visit. Pt would continue to benefit from skilled therapy interventions to address remaining impairments, improve mobility and strength and progress toward goal completion while reducing risk for re-injury or further decline. End session pain: same - looser     Plan for Next Session: manual to end ranges without heavy overpressure, RTC/deltoid strengthening, scapular strengthening, modalities PRN.        Time In / Time Out:   5353-2464           Timed Code/Total Treatment Minutes:  45/45'    10' TA  2 28' TE       Next Progress Note due:  Eval 12/29/22   Visit 10       Plan of Care Interventions:  [x] Therapeutic Exercise  [x] Modalities:  [x] Therapeutic Activity     [] Ultrasound  [] Estim  [] Gait Training      [] Cervical Traction [] Lumbar Traction  [x] Neuromuscular Re-education    [x] Cold/hotpack [] Iontophoresis   [x] Instruction in HEP      [x] Vasopneumatic   [] Dry Needling    [x] Manual Therapy               [] Aquatic Therapy              Electronically signed by:  Glenn Florentino PT, DPT, OCS     1/17/2023 4:30 PM

## 2023-01-19 ENCOUNTER — HOSPITAL ENCOUNTER (OUTPATIENT)
Dept: PHYSICAL THERAPY | Age: 43
Setting detail: THERAPIES SERIES
Discharge: HOME | End: 2023-01-19
Payer: OTHER GOVERNMENT

## 2023-01-19 PROCEDURE — 97530 THERAPEUTIC ACTIVITIES: CPT

## 2023-01-19 PROCEDURE — 97110 THERAPEUTIC EXERCISES: CPT

## 2023-01-19 NOTE — FLOWSHEET NOTE
Outpatient Physical Therapy  [place]           [x] Phone: [phone]   Fax: [phone]  [facility]           [] Phone: [phone]   Fax: [phone]        Physical Therapy Daily Treatment Note  Date:  2023    Patient Name:  Uriel Smith    :  1980  MRN: [id number]  Restrictions/Precautions: no active resisted IR, to tolerance with ROM with PROM. Diagnosis:   Pain in left shoulder [M25.512]   L total shoulder replacement. Date of Injury/Surgery: 2022  Treatment Diagnosis:  L UE capsular stiffness, reduced A/PROM, pain and weakness  Insurance/Certification information:  [facility] 15 visits approved   Referring Physician:  NICKOLAS Domínguez     PCP: NICKOLAS Genao  Next Doctor Visit:    Plan of care signed (Y/N):  N, sent 22   Outcome Measure: QD: 39% disability   Visit# / total visits:  3/15  Pain level: 0 /10       Goals:     Patient goals: improve the strength and ROM  Short term goals  Time Frame for Short term goals: Defer to [address] Term Goals  Time Frame for Long Term Goals: 8 weeks   3/1/22  Pt will demo I with HEP/symptom management. Reports compliance   Pt will demo >15 deg improvement in L UE A/PROM shoulder flex/ABD/ER to ease overhead reaching. Pt will demo <20% disability per Quick DASH to demo improved function. Pt will demo able to functionally reach and lifting 3# with arm extended in all directions without pain to ease painting. Pt will demo >4+/5 L UE strength in all direction with min/no pain to ease heavy lifting. Summary of Evaluation:   Pt is 36year old male s/p L total shoulder replacement 22. Pt now has difficulties completing lifting, sleeping on shoulder and work duties. Pt demo deficits this date that include L UE A/PROM, capsular stiffness and min pain. Pt will benefit with PT services with progression of strength/ROM, manual and modalities to return to PLOF.  Pt prior to onset of current condition had min/no pain with able to complete full ADLs and work activities. Patient received education on their current pathology and how their condition effects them with their functional activities. Patient understood discussion and questions were answered. Patient understands their activity limitations and understands rational for treatment progression. Subjective:  Jim Quintanilla arrives to therapy stating that there is currently no pain in the shoulder, just soreness. Any changes in Ambulatory Summary Sheet? None        Objective:   COVID screening questions were asked and patient attested that there had been no contact or symptoms    Tendency to with scapular elevation compensation with end range flexion. Good technique with strengthening exercises.    No Cramp sensation with light IR resistance this date   Fatigue finishing sets/reps into shoulder but able to continue with temporary rest         Exercises: (No more than 4 columns)   Exercise/Equipment 12/29/22  #1 1/4/2023 #2 1/17/23  #3 1/19/23   #4            WARM UP       Pulley   15* flex     UBE  2'/2' @ 120 1.5/1.5' @ 76 RPM  1.5/1.5' @ 76 RPM                                TABLE       Taffy pull  GTB 10x2 GTB 2*10     Resisted punches  GTB or supine with 5# 10x2 5# 2*10 supine  Standing 10x2 GTB 10x2   5#   with eccentric lower    Table flexion slides with stool  10x2  3\" Counter top 2*10 3\"      Supine AAROM flexion with cane to end range with light resistance   2# bar 2*10  2# with 3# resistance  10x2 3\"    SL ER     5# 10x2                        STANDING       Mid rows  Discussed  -- BTB 10x2    PROM shoulder ABD with cane  10x2 3\" 2*10 3\"      TRX shoulder flexion stretch   10*5\" 10x  3\"    ER with cane in neutral to end range with overpressure   2*10 3\"      TRX pulls    10x2 10x2   Roller on wall    10x2  3\"  3# 10x2      ABD off tall table      3# 10x2 4# 10x2    Pulldowns    RTB 10x2 BTB 10x2   Resisted IR    YTB 10x2 YTB 10x2   ER walkouts GTB 5x2    statue of liberty    3# 1 lap  3# 1 lap    Shuttle UE press     1c 10x2   Bicep curls     8# 15x2   Lateral OH ball taps     2# 20x2                                      PROPRIOCEPTION       Ball throw into rebounder with overhead reach    2# 10x2    Bodyblade    20\"x3 in neutral  20\" in neutral   20\" in flex                         MODALITIES                         Other Therapeutic Activities/Education:  none       Home Exercise Program:  HO issued, reviewed and discussed with patient. Pt agreed to comply.        Manual Treatments:        Modalities:  --      Communication with other providers:  POC sent 12/29/22       Assessment:   Jc tolerated today's session well, including additional exercises. Without cramp into L pec region with IR activation this date. Min cues to reduce scapular elevation. Min stiffness at end range shoulder but very functional with improving strength/tolerance. Fatigues with muscular burn with activities. Will continue at 1x per week. Will assess and progress next visit. Pt would continue to benefit from skilled therapy interventions to address remaining impairments, improve mobility and strength and progress toward goal completion while reducing risk for re-injury or further decline.     End session pain: same - fatigue    Plan for Next Session: manual to end ranges without heavy overpressure, RTC/deltoid strengthening, scapular strengthening, modalities PRN.       Time In / Time Out:   9480-0114           Timed Code/Total Treatment Minutes:  40/40'      10' TA  2 30' TE       Next Progress Note due:  [place] 12/29/22   Visit 10       Plan of Care Interventions:  [x] Therapeutic Exercise  [x] Modalities:  [x] Therapeutic Activity     [] Ultrasound  [] Estim  [] Gait Training      [] Cervical Traction [] Lumbar Traction  [x] Neuromuscular Re-education    [x] Cold/hotpack [] Iontophoresis   [x] Instruction in HEP      [x] Vasopneumatic   [] Dry Needling    [x] Manual Therapy    [] Aquatic Therapy              Electronically signed by:  William Lopez PT, DPT, OCS     1/19/2023 4:48 PM

## 2023-02-02 ENCOUNTER — HOSPITAL ENCOUNTER (OUTPATIENT)
Dept: PHYSICAL THERAPY | Age: 43
Setting detail: THERAPIES SERIES
Discharge: HOME | End: 2023-02-02
Payer: OTHER GOVERNMENT

## 2023-02-02 PROCEDURE — 97112 NEUROMUSCULAR REEDUCATION: CPT

## 2023-02-02 PROCEDURE — 97110 THERAPEUTIC EXERCISES: CPT

## 2023-02-02 NOTE — FLOWSHEET NOTE
Outpatient Physical Therapy  [place]           [x] Phone: [phone]   Fax: [phone]  [place]           [] Phone: [phone]   Fax: [phone]        Physical Therapy Daily Treatment Note  Date:  2023    Patient Name:  Gene Flower    :  1980  MRN: [id number]  Restrictions/Precautions: no active resisted IR, to tolerance with ROM with PROM. Diagnosis:   Pain in left shoulder [M25.512]   L total shoulder replacement. Date of Injury/Surgery: 2022  Treatment Diagnosis:  L UE capsular stiffness, reduced A/PROM, pain and weakness  Insurance/Certification information:  [place] 15 visits approved   Referring Physician:  NICKOLAS Aviles     PCP: NICKOLAS Zaldivar  Next Doctor Visit:    Plan of care signed (Y/N):  N, sent 22   Outcome Measure: QD: 39% disability   Visit# / total visits:  4/15  Pain level: 0 /10       Goals:     Patient goals: improve the strength and ROM  Short term goals  Time Frame for Short term goals: Defer to [address] Term Goals  Time Frame for Long Term Goals: 8 weeks   3/1/22  Pt will demo I with HEP/symptom management. Reports compliance   Pt will demo >15 deg improvement in L UE A/PROM shoulder flex/ABD/ER to ease overhead reaching. Pt will demo <20% disability per Quick DASH to demo improved function. Pt will demo able to functionally reach and lifting 3# with arm extended in all directions without pain to ease painting. Pt will demo >4+/5 L UE strength in all direction with min/no pain to ease heavy lifting. Summary of Evaluation:   Pt is 36year old male s/p L total shoulder replacement 22. Pt now has difficulties completing lifting, sleeping on shoulder and work duties. Pt demo deficits this date that include L UE A/PROM, capsular stiffness and min pain. Pt will benefit with PT services with progression of strength/ROM, manual and modalities to return to PLOF.  Pt prior to onset of current condition had min/no pain with able to complete full ADLs and work activities. Patient received education on their current pathology and how their condition effects them with their functional activities. Patient understood discussion and questions were answered. Patient understands their activity limitations and understands rational for treatment progression. Subjective:  Mariah Oro arrives to therapy stating that there is currently no pain in the shoulder, just soreness. Any changes in Ambulatory Summary Sheet? None        Objective:   COVID screening questions were asked and patient attested that there had been no contact or symptoms    Tendency with scapular elevation compensation with end range flexion. Good technique with strengthening exercises.    No Cramp sensation with light IR resistance this date   Fatigue finishing sets/reps into shoulder but able to continue with temporary rest         Exercises: (No more than 4 columns)   Exercise/Equipment 12/29/22  #1 1/4/2023 #2 1/17/23  #3 1/19/23   #4 2/2/23  #5             WARM UP        Pulley   15* flex      UBE  2'/2' @ 120 1.5/1.5' @ 76 RPM  1.5/1.5' @ 76 RPM  1.5/1.5' @ 76 RPM                                    TABLE        Taffy pull  GTB 10x2 GTB 2*10      Resisted punches  GTB or supine with 5# 10x2 5# 2*10 supine  Standing 10x2 GTB 10x2   5#   with eccentric lower     Table flexion slides with stool  10x2  3\" Counter top 2*10 3\"       Supine AAROM flexion with cane to end range with light resistance   2# bar 2*10  2# with 3# resistance  10x2 3\"     SL ER     5# 10x2                            STANDING        Mid rows  Discussed  -- BTB 10x2     PROM shoulder ABD with cane  10x2 3\" 2*10 3\"       TRX shoulder flexion stretch   10*5\" 10x  3\"  10x  3\"   ER with cane in neutral to end range with overpressure   2*10 3\"       TRX pulls    10x2 10x2 10x2   Roller on wall    10x2  3\"  3# 10x2 4# 10x2      ABD off tall table      3# 10x2 4# 10x2     Pulldowns    RTB 10x2 BTB 10x2  10x2  40#    Resisted IR    YTB 10x2 YTB 10x2    ER walkouts    GTB 5x2     statue of liberty    3# 1 lap  3# 1 lap  4# 1 lap    Shuttle UE press     1c 10x2 1c 10x2    Bicep curls     8# 15x2    Lateral OH ball taps     2# 20x2    Tricep pulldown      40# 10x2   Lateral wall walks      GTB 10x2   Punches      DGTT 10x2                   PROPRIOCEPTION        Ball throw into rebounder with overhead reach    2# 10x2  6# OH and across body 10x2   Bodyblade    20\"x3 in neutral  20\" in neutral   20\" in flex     Plank on BOSU      20\"x3   Ball catch on wobbleboard      2# 15x2           MODALITIES                            Other Therapeutic Activities/Education:  none       Home Exercise Program:  HO issued, reviewed and discussed with patient. Pt agreed to comply. Manual Treatments:        Modalities:  --      Communication with other providers:  POC sent 12/29/22       Assessment:   Jc tolerated today's session well, including additional exercises. Min cues to reduce scapular elevation. Min stiffness at end range shoulder but very functional with improving strength/tolerance. Fatigues with muscular burn with activities. Will assess next visit with anticipate being placed on hold with home program next visit. Pt agreed to this plan. Pt would continue to benefit from skilled therapy interventions to address remaining impairments, improve mobility and strength and progress toward goal completion while reducing risk for re-injury or further decline. End session pain: same - fatigue    Plan for Next Session: manual to end ranges without heavy overpressure, RTC/deltoid strengthening, scapular strengthening, modalities PRN.        Time In / Time Out:   3133-5629           Timed Code/Total Treatment Minutes:  40/40'      10' neuro  2 27' TE       Next Progress Note due:  [place] 12/29/22   Visit 10       Plan of Care Interventions:  [x] Therapeutic Exercise  [x] Modalities:  [x] Therapeutic Activity [] Ultrasound  [] Estim  [] Gait Training      [] Cervical Traction [] Lumbar Traction  [x] Neuromuscular Re-education    [x] Cold/hotpack [] Iontophoresis   [x] Instruction in HEP      [x] Vasopneumatic   [] Dry Needling    [x] Manual Therapy               [] Aquatic Therapy              Electronically signed by:  Marti Murray PT, DPT, OCS     2/2/2023 6:58 AM

## 2023-02-09 ENCOUNTER — HOSPITAL ENCOUNTER (OUTPATIENT)
Dept: PHYSICAL THERAPY | Age: 43
Discharge: HOME | End: 2023-02-09

## 2023-02-09 NOTE — FLOWSHEET NOTE
Physical Therapy  Cancellation/No-show Note  Patient Name:  Mando Palumbo  :  1980   Date:  2023  Cancelled visits to date: 1  No-shows to date: 2    For today's appointment patient:  []  Cancelled  []  Rescheduled appointment  [x]  No-show     Reason given by patient:  []  Patient ill  []  Conflicting appointment  []  No transportation    []  Conflict with work  []  No reason given  [x]  Other:     Comments: No future follow up scheduled, will discharge if no follow up completed.      Electronically signed by:  Taryn Sanchez PT, DPT, OCS     2023 4:27 PM

## 2023-08-15 ENCOUNTER — OFFICE VISIT (OUTPATIENT)
Dept: FAMILY MEDICINE CLINIC | Age: 43
End: 2023-08-15
Payer: COMMERCIAL

## 2023-08-15 VITALS
DIASTOLIC BLOOD PRESSURE: 102 MMHG | WEIGHT: 241.2 LBS | TEMPERATURE: 98.1 F | SYSTOLIC BLOOD PRESSURE: 168 MMHG | HEART RATE: 100 BPM | BODY MASS INDEX: 32.71 KG/M2 | OXYGEN SATURATION: 96 %

## 2023-08-15 DIAGNOSIS — R05.1 ACUTE COUGH: ICD-10-CM

## 2023-08-15 DIAGNOSIS — R09.81 SINUS CONGESTION: ICD-10-CM

## 2023-08-15 DIAGNOSIS — J40 BRONCHITIS: Primary | ICD-10-CM

## 2023-08-15 PROCEDURE — 1036F TOBACCO NON-USER: CPT | Performed by: NURSE PRACTITIONER

## 2023-08-15 PROCEDURE — G8427 DOCREV CUR MEDS BY ELIG CLIN: HCPCS | Performed by: NURSE PRACTITIONER

## 2023-08-15 PROCEDURE — 99213 OFFICE O/P EST LOW 20 MIN: CPT | Performed by: NURSE PRACTITIONER

## 2023-08-15 PROCEDURE — G8419 CALC BMI OUT NRM PARAM NOF/U: HCPCS | Performed by: NURSE PRACTITIONER

## 2023-08-15 RX ORDER — AZITHROMYCIN 250 MG/1
250 TABLET, FILM COATED ORAL SEE ADMIN INSTRUCTIONS
Qty: 6 TABLET | Refills: 0 | Status: SHIPPED | OUTPATIENT
Start: 2023-08-15 | End: 2023-08-20

## 2023-08-15 RX ORDER — BENZONATATE 200 MG/1
200 CAPSULE ORAL 3 TIMES DAILY PRN
Qty: 30 CAPSULE | Refills: 0 | Status: SHIPPED | OUTPATIENT
Start: 2023-08-15 | End: 2023-08-25

## 2023-08-15 ASSESSMENT — ENCOUNTER SYMPTOMS
NAUSEA: 0
VOMITING: 0
WHEEZING: 0
GASTROINTESTINAL NEGATIVE: 1
SINUS PRESSURE: 0
COUGH: 1
EYES NEGATIVE: 1
DIARRHEA: 0
SORE THROAT: 0
SHORTNESS OF BREATH: 0

## 2023-08-15 NOTE — PROGRESS NOTES
Rosibel López (:  1980) is a 36 y.o. male,Established patient, here for evaluation of the following chief complaint(s):  Cough and Congestion         ASSESSMENT/PLAN:  1. Bronchitis  -     azithromycin (ZITHROMAX) 250 MG tablet; Take 1 tablet by mouth See Admin Instructions for 5 days 500mg on day 1 followed by 250mg on days 2 - 5, Disp-6 tablet, R-0Normal  -     benzonatate (TESSALON) 200 MG capsule; Take 1 capsule by mouth 3 times daily as needed for Cough, Disp-30 capsule, R-0Normal  2. Sinus congestion  3. Acute cough  -     benzonatate (TESSALON) 200 MG capsule; Take 1 capsule by mouth 3 times daily as needed for Cough, Disp-30 capsule, R-0Normal      Return if symptoms worsen or fail to improve, for with PCP. Increase fluids and rest  Saline nasal spray as needed for nasal congestion  Warm salt water gargles as needed for throat discomfort  Can use hot tea with lemon/honey for cough and sore throat. Monitor temperature twice a day  Tylenol as needed for fevers and/or discomfort. Subjective   SUBJECTIVE/OBJECTIVE:  Cough  Pertinent negatives include no chills, fever, headaches, postnasal drip, sore throat, shortness of breath or wheezing. See NN for HPI. Hears wheezing in chest at time. Review of Systems   Constitutional:  Negative for chills, diaphoresis and fever. HENT:  Positive for congestion. Negative for postnasal drip, sinus pressure and sore throat. Eyes: Negative. Respiratory:  Positive for cough. Negative for shortness of breath and wheezing. Cardiovascular: Negative. Gastrointestinal: Negative. Negative for diarrhea, nausea and vomiting. Endocrine: Negative. Neurological: Negative. Negative for headaches. Objective   Physical Exam  Constitutional:       Appearance: He is well-developed. HENT:      Head: Normocephalic and atraumatic.       Right Ear: Tympanic membrane and external ear normal.      Left Ear: Tympanic membrane and

## 2023-08-30 ENCOUNTER — OFFICE VISIT (OUTPATIENT)
Dept: FAMILY MEDICINE CLINIC | Age: 43
End: 2023-08-30
Payer: COMMERCIAL

## 2023-08-30 ENCOUNTER — HOSPITAL ENCOUNTER (OUTPATIENT)
Dept: GENERAL RADIOLOGY | Age: 43
Discharge: HOME | End: 2023-08-30
Payer: COMMERCIAL

## 2023-08-30 ENCOUNTER — HOSPITAL ENCOUNTER (OUTPATIENT)
Age: 43
Discharge: HOME | End: 2023-08-30
Payer: COMMERCIAL

## 2023-08-30 VITALS
WEIGHT: 241 LBS | SYSTOLIC BLOOD PRESSURE: 162 MMHG | OXYGEN SATURATION: 96 % | HEART RATE: 50 BPM | DIASTOLIC BLOOD PRESSURE: 108 MMHG | TEMPERATURE: 97.4 F | BODY MASS INDEX: 32.69 KG/M2

## 2023-08-30 DIAGNOSIS — R03.0 ELEVATED BLOOD PRESSURE READING: ICD-10-CM

## 2023-08-30 DIAGNOSIS — J40 BRONCHITIS: ICD-10-CM

## 2023-08-30 DIAGNOSIS — J40 BRONCHITIS: Primary | ICD-10-CM

## 2023-08-30 PROCEDURE — 99213 OFFICE O/P EST LOW 20 MIN: CPT | Performed by: NURSE PRACTITIONER

## 2023-08-30 PROCEDURE — 1036F TOBACCO NON-USER: CPT | Performed by: NURSE PRACTITIONER

## 2023-08-30 PROCEDURE — G8419 CALC BMI OUT NRM PARAM NOF/U: HCPCS | Performed by: NURSE PRACTITIONER

## 2023-08-30 PROCEDURE — G8427 DOCREV CUR MEDS BY ELIG CLIN: HCPCS | Performed by: NURSE PRACTITIONER

## 2023-08-30 PROCEDURE — 71046 X-RAY EXAM CHEST 2 VIEWS: CPT

## 2023-08-30 RX ORDER — METHYLPREDNISOLONE 4 MG/1
TABLET ORAL
Qty: 1 KIT | Refills: 0 | Status: SHIPPED | OUTPATIENT
Start: 2023-08-30 | End: 2023-09-05

## 2023-08-30 RX ORDER — IBUPROFEN 600 MG/1
600 TABLET ORAL EVERY 6 HOURS PRN
COMMUNITY

## 2023-08-30 RX ORDER — BENZONATATE 100 MG/1
100 CAPSULE ORAL 3 TIMES DAILY PRN
Qty: 20 CAPSULE | Refills: 0 | Status: SHIPPED | OUTPATIENT
Start: 2023-08-30

## 2023-08-30 ASSESSMENT — ENCOUNTER SYMPTOMS
SINUS PAIN: 0
WHEEZING: 1
RHINORRHEA: 0
SORE THROAT: 0
VOMITING: 0
SHORTNESS OF BREATH: 1
COUGH: 1
HEMOPTYSIS: 0
DIARRHEA: 0
CHEST TIGHTNESS: 1
NAUSEA: 0
HEARTBURN: 0
SINUS PRESSURE: 0

## 2024-04-02 ENCOUNTER — HOSPITAL ENCOUNTER (INPATIENT)
Age: 44
LOS: 6 days | Discharge: HOME | DRG: 872 | End: 2024-04-08
Attending: INTERNAL MEDICINE | Admitting: STUDENT IN AN ORGANIZED HEALTH CARE EDUCATION/TRAINING PROGRAM
Payer: COMMERCIAL

## 2024-04-02 DIAGNOSIS — M25.512 ACUTE PAIN OF LEFT SHOULDER: Primary | ICD-10-CM

## 2024-04-02 PROBLEM — A41.9 SEPSIS (HCC): Status: ACTIVE | Noted: 2024-04-02

## 2024-04-02 PROCEDURE — 1200000000 HC SEMI PRIVATE

## 2024-04-02 PROCEDURE — 6370000000 HC RX 637 (ALT 250 FOR IP): Performed by: STUDENT IN AN ORGANIZED HEALTH CARE EDUCATION/TRAINING PROGRAM

## 2024-04-02 RX ORDER — SODIUM CHLORIDE 9 MG/ML
INJECTION, SOLUTION INTRAVENOUS PRN
Status: DISCONTINUED | OUTPATIENT
Start: 2024-04-02 | End: 2024-04-08 | Stop reason: HOSPADM

## 2024-04-02 RX ORDER — SODIUM CHLORIDE, SODIUM LACTATE, POTASSIUM CHLORIDE, CALCIUM CHLORIDE 600; 310; 30; 20 MG/100ML; MG/100ML; MG/100ML; MG/100ML
INJECTION, SOLUTION INTRAVENOUS CONTINUOUS
Status: DISPENSED | OUTPATIENT
Start: 2024-04-02 | End: 2024-04-03

## 2024-04-02 RX ORDER — ACETAMINOPHEN 325 MG/1
650 TABLET ORAL EVERY 6 HOURS PRN
Status: DISCONTINUED | OUTPATIENT
Start: 2024-04-02 | End: 2024-04-06

## 2024-04-02 RX ORDER — OXYCODONE HYDROCHLORIDE 5 MG/1
5 TABLET ORAL EVERY 6 HOURS PRN
Status: DISCONTINUED | OUTPATIENT
Start: 2024-04-02 | End: 2024-04-03

## 2024-04-02 RX ORDER — ACETAMINOPHEN 650 MG/1
650 SUPPOSITORY RECTAL EVERY 6 HOURS PRN
Status: DISCONTINUED | OUTPATIENT
Start: 2024-04-02 | End: 2024-04-06

## 2024-04-02 RX ORDER — SODIUM CHLORIDE 0.9 % (FLUSH) 0.9 %
5-40 SYRINGE (ML) INJECTION EVERY 12 HOURS SCHEDULED
Status: DISCONTINUED | OUTPATIENT
Start: 2024-04-02 | End: 2024-04-08 | Stop reason: HOSPADM

## 2024-04-02 RX ORDER — ENOXAPARIN SODIUM 100 MG/ML
40 INJECTION SUBCUTANEOUS DAILY
Status: DISCONTINUED | OUTPATIENT
Start: 2024-04-03 | End: 2024-04-03

## 2024-04-02 RX ORDER — MORPHINE SULFATE 2 MG/ML
2 INJECTION, SOLUTION INTRAMUSCULAR; INTRAVENOUS EVERY 4 HOURS PRN
Status: COMPLETED | OUTPATIENT
Start: 2024-04-02 | End: 2024-04-03

## 2024-04-02 RX ORDER — SODIUM CHLORIDE 0.9 % (FLUSH) 0.9 %
5-40 SYRINGE (ML) INJECTION PRN
Status: DISCONTINUED | OUTPATIENT
Start: 2024-04-02 | End: 2024-04-08 | Stop reason: HOSPADM

## 2024-04-02 RX ADMIN — ACETAMINOPHEN 650 MG: 325 TABLET ORAL at 23:52

## 2024-04-02 ASSESSMENT — PAIN DESCRIPTION - LOCATION
LOCATION: SHOULDER;HEAD
LOCATION: SHOULDER;HEAD

## 2024-04-02 ASSESSMENT — PAIN DESCRIPTION - PAIN TYPE: TYPE: ACUTE PAIN

## 2024-04-02 ASSESSMENT — PAIN SCALES - GENERAL
PAINLEVEL_OUTOF10: 9
PAINLEVEL_OUTOF10: 9

## 2024-04-02 ASSESSMENT — PAIN - FUNCTIONAL ASSESSMENT
PAIN_FUNCTIONAL_ASSESSMENT: ACTIVITIES ARE NOT PREVENTED
PAIN_FUNCTIONAL_ASSESSMENT: ACTIVITIES ARE NOT PREVENTED

## 2024-04-02 ASSESSMENT — PAIN DESCRIPTION - ORIENTATION
ORIENTATION: LEFT
ORIENTATION: LEFT

## 2024-04-02 ASSESSMENT — PAIN DESCRIPTION - DESCRIPTORS
DESCRIPTORS: ACHING
DESCRIPTORS: ACHING

## 2024-04-03 ENCOUNTER — APPOINTMENT (OUTPATIENT)
Dept: GENERAL RADIOLOGY | Age: 44
DRG: 872 | End: 2024-04-03
Attending: INTERNAL MEDICINE
Payer: COMMERCIAL

## 2024-04-03 ENCOUNTER — APPOINTMENT (OUTPATIENT)
Dept: CT IMAGING | Age: 44
DRG: 872 | End: 2024-04-03
Attending: INTERNAL MEDICINE
Payer: COMMERCIAL

## 2024-04-03 LAB
ALBUMIN SERPL-MCNC: 3.5 GM/DL (ref 3.4–5)
ALP BLD-CCNC: 110 IU/L (ref 40–128)
ALT SERPL-CCNC: 18 U/L (ref 10–40)
ANION GAP SERPL CALCULATED.3IONS-SCNC: 11 MMOL/L (ref 7–16)
AST SERPL-CCNC: 13 IU/L (ref 15–37)
BASOPHILS ABSOLUTE: 0.1 K/CU MM
BASOPHILS RELATIVE PERCENT: 0.3 % (ref 0–1)
BILIRUB SERPL-MCNC: 1 MG/DL (ref 0–1)
BUN SERPL-MCNC: 17 MG/DL (ref 6–23)
CALCIUM SERPL-MCNC: 7.9 MG/DL (ref 8.3–10.6)
CHLORIDE BLD-SCNC: 102 MMOL/L (ref 99–110)
CO2: 21 MMOL/L (ref 21–32)
CREAT SERPL-MCNC: 1.2 MG/DL (ref 0.9–1.3)
DIFFERENTIAL TYPE: ABNORMAL
EOSINOPHILS ABSOLUTE: 0 K/CU MM
EOSINOPHILS RELATIVE PERCENT: 0 % (ref 0–3)
GFR SERPL CREATININE-BSD FRML MDRD: 76 ML/MIN/1.73M2
GLUCOSE SERPL-MCNC: 110 MG/DL (ref 70–99)
HCT VFR BLD CALC: 42.4 % (ref 42–52)
HEMOGLOBIN: 14.7 GM/DL (ref 13.5–18)
IMMATURE NEUTROPHIL %: 0.5 % (ref 0–0.43)
LYMPHOCYTES ABSOLUTE: 1 K/CU MM
LYMPHOCYTES RELATIVE PERCENT: 5.8 % (ref 24–44)
MCH RBC QN AUTO: 32.2 PG (ref 27–31)
MCHC RBC AUTO-ENTMCNC: 34.7 % (ref 32–36)
MCV RBC AUTO: 92.8 FL (ref 78–100)
MONOCYTES ABSOLUTE: 1.3 K/CU MM
MONOCYTES RELATIVE PERCENT: 7.3 % (ref 0–4)
MRSA, DNA, NASAL: NEGATIVE
NUCLEATED RBC %: 0 %
PDW BLD-RTO: 13.2 % (ref 11.7–14.9)
PLATELET # BLD: 192 K/CU MM (ref 140–440)
PMV BLD AUTO: 8.8 FL (ref 7.5–11.1)
POTASSIUM SERPL-SCNC: 3.9 MMOL/L (ref 3.5–5.1)
RBC # BLD: 4.57 M/CU MM (ref 4.6–6.2)
SEGMENTED NEUTROPHILS ABSOLUTE COUNT: 15.3 K/CU MM
SEGMENTED NEUTROPHILS RELATIVE PERCENT: 86.1 % (ref 36–66)
SODIUM BLD-SCNC: 134 MMOL/L (ref 135–145)
SPECIMEN DESCRIPTION: NORMAL
TOTAL IMMATURE NEUTOROPHIL: 0.09 K/CU MM
TOTAL NUCLEATED RBC: 0 K/CU MM
TOTAL PROTEIN: 6.1 GM/DL (ref 6.4–8.2)
WBC # BLD: 17.7 K/CU MM (ref 4–10.5)

## 2024-04-03 PROCEDURE — 80053 COMPREHEN METABOLIC PANEL: CPT

## 2024-04-03 PROCEDURE — 6370000000 HC RX 637 (ALT 250 FOR IP): Performed by: FAMILY MEDICINE

## 2024-04-03 PROCEDURE — 2580000003 HC RX 258: Performed by: STUDENT IN AN ORGANIZED HEALTH CARE EDUCATION/TRAINING PROGRAM

## 2024-04-03 PROCEDURE — 85025 COMPLETE CBC W/AUTO DIFF WBC: CPT

## 2024-04-03 PROCEDURE — 6360000002 HC RX W HCPCS: Performed by: STUDENT IN AN ORGANIZED HEALTH CARE EDUCATION/TRAINING PROGRAM

## 2024-04-03 PROCEDURE — 6360000002 HC RX W HCPCS: Performed by: FAMILY MEDICINE

## 2024-04-03 PROCEDURE — 80048 BASIC METABOLIC PNL TOTAL CA: CPT

## 2024-04-03 PROCEDURE — 73201 CT UPPER EXTREMITY W/DYE: CPT

## 2024-04-03 PROCEDURE — 6370000000 HC RX 637 (ALT 250 FOR IP): Performed by: STUDENT IN AN ORGANIZED HEALTH CARE EDUCATION/TRAINING PROGRAM

## 2024-04-03 PROCEDURE — 87040 BLOOD CULTURE FOR BACTERIA: CPT

## 2024-04-03 PROCEDURE — 99221 1ST HOSP IP/OBS SF/LOW 40: CPT | Performed by: STUDENT IN AN ORGANIZED HEALTH CARE EDUCATION/TRAINING PROGRAM

## 2024-04-03 PROCEDURE — 73030 X-RAY EXAM OF SHOULDER: CPT

## 2024-04-03 PROCEDURE — 36415 COLL VENOUS BLD VENIPUNCTURE: CPT

## 2024-04-03 PROCEDURE — 94761 N-INVAS EAR/PLS OXIMETRY MLT: CPT

## 2024-04-03 PROCEDURE — 87641 MR-STAPH DNA AMP PROBE: CPT

## 2024-04-03 PROCEDURE — 1200000000 HC SEMI PRIVATE

## 2024-04-03 PROCEDURE — C9113 INJ PANTOPRAZOLE SODIUM, VIA: HCPCS | Performed by: FAMILY MEDICINE

## 2024-04-03 PROCEDURE — 6360000004 HC RX CONTRAST MEDICATION: Performed by: STUDENT IN AN ORGANIZED HEALTH CARE EDUCATION/TRAINING PROGRAM

## 2024-04-03 RX ORDER — ONDANSETRON 4 MG/1
4 TABLET, ORALLY DISINTEGRATING ORAL EVERY 8 HOURS PRN
Status: DISCONTINUED | OUTPATIENT
Start: 2024-04-03 | End: 2024-04-08 | Stop reason: HOSPADM

## 2024-04-03 RX ORDER — BUPROPION HYDROCHLORIDE 100 MG/1
100 TABLET ORAL 2 TIMES DAILY
Status: DISCONTINUED | OUTPATIENT
Start: 2024-04-03 | End: 2024-04-08 | Stop reason: HOSPADM

## 2024-04-03 RX ORDER — IBUPROFEN 200 MG
400 TABLET ORAL EVERY 6 HOURS PRN
COMMUNITY

## 2024-04-03 RX ORDER — BUPROPION HYDROCHLORIDE 100 MG/1
100 TABLET ORAL 2 TIMES DAILY
COMMUNITY

## 2024-04-03 RX ORDER — PANTOPRAZOLE SODIUM 40 MG/1
40 TABLET, DELAYED RELEASE ORAL
Status: DISCONTINUED | OUTPATIENT
Start: 2024-04-04 | End: 2024-04-08 | Stop reason: HOSPADM

## 2024-04-03 RX ORDER — PANTOPRAZOLE SODIUM 40 MG/10ML
40 INJECTION, POWDER, LYOPHILIZED, FOR SOLUTION INTRAVENOUS ONCE
Status: COMPLETED | OUTPATIENT
Start: 2024-04-03 | End: 2024-04-03

## 2024-04-03 RX ORDER — OXYCODONE HYDROCHLORIDE 5 MG/1
5 TABLET ORAL EVERY 6 HOURS PRN
Status: DISCONTINUED | OUTPATIENT
Start: 2024-04-03 | End: 2024-04-04

## 2024-04-03 RX ORDER — SODIUM CHLORIDE, SODIUM LACTATE, POTASSIUM CHLORIDE, CALCIUM CHLORIDE 600; 310; 30; 20 MG/100ML; MG/100ML; MG/100ML; MG/100ML
INJECTION, SOLUTION INTRAVENOUS CONTINUOUS
Status: DISPENSED | OUTPATIENT
Start: 2024-04-03 | End: 2024-04-03

## 2024-04-03 RX ORDER — ENOXAPARIN SODIUM 100 MG/ML
30 INJECTION SUBCUTANEOUS 2 TIMES DAILY
Status: DISCONTINUED | OUTPATIENT
Start: 2024-04-03 | End: 2024-04-08 | Stop reason: HOSPADM

## 2024-04-03 RX ADMIN — SODIUM CHLORIDE, PRESERVATIVE FREE 10 ML: 5 INJECTION INTRAVENOUS at 09:36

## 2024-04-03 RX ADMIN — OXYCODONE HYDROCHLORIDE 5 MG: 5 TABLET ORAL at 12:54

## 2024-04-03 RX ADMIN — VANCOMYCIN HYDROCHLORIDE 2000 MG: 5 INJECTION, POWDER, LYOPHILIZED, FOR SOLUTION INTRAVENOUS at 02:59

## 2024-04-03 RX ADMIN — BUPROPION HYDROCHLORIDE 100 MG: 100 TABLET ORAL at 20:03

## 2024-04-03 RX ADMIN — BUPROPION HYDROCHLORIDE 100 MG: 100 TABLET ORAL at 09:37

## 2024-04-03 RX ADMIN — CEFTRIAXONE SODIUM 2000 MG: 2 INJECTION, POWDER, FOR SOLUTION INTRAMUSCULAR; INTRAVENOUS at 16:51

## 2024-04-03 RX ADMIN — SODIUM CHLORIDE, PRESERVATIVE FREE 10 ML: 5 INJECTION INTRAVENOUS at 20:06

## 2024-04-03 RX ADMIN — HYDROMORPHONE HYDROCHLORIDE 0.5 MG: 1 INJECTION, SOLUTION INTRAMUSCULAR; INTRAVENOUS; SUBCUTANEOUS at 21:53

## 2024-04-03 RX ADMIN — ENOXAPARIN SODIUM 30 MG: 100 INJECTION SUBCUTANEOUS at 09:37

## 2024-04-03 RX ADMIN — MORPHINE SULFATE 2 MG: 2 INJECTION, SOLUTION INTRAMUSCULAR; INTRAVENOUS at 00:49

## 2024-04-03 RX ADMIN — ONDANSETRON 4 MG: 4 TABLET, ORALLY DISINTEGRATING ORAL at 20:10

## 2024-04-03 RX ADMIN — OXYCODONE HYDROCHLORIDE 5 MG: 5 TABLET ORAL at 20:03

## 2024-04-03 RX ADMIN — OXYCODONE HYDROCHLORIDE 5 MG: 5 TABLET ORAL at 06:32

## 2024-04-03 RX ADMIN — ACETAMINOPHEN 650 MG: 325 TABLET ORAL at 12:54

## 2024-04-03 RX ADMIN — MORPHINE SULFATE 2 MG: 2 INJECTION, SOLUTION INTRAMUSCULAR; INTRAVENOUS at 09:41

## 2024-04-03 RX ADMIN — BUPROPION HYDROCHLORIDE 100 MG: 100 TABLET ORAL at 00:57

## 2024-04-03 RX ADMIN — MORPHINE SULFATE 2 MG: 2 INJECTION, SOLUTION INTRAMUSCULAR; INTRAVENOUS at 16:47

## 2024-04-03 RX ADMIN — SODIUM CHLORIDE, POTASSIUM CHLORIDE, SODIUM LACTATE AND CALCIUM CHLORIDE: 600; 310; 30; 20 INJECTION, SOLUTION INTRAVENOUS at 00:57

## 2024-04-03 RX ADMIN — HYDROMORPHONE HYDROCHLORIDE 0.5 MG: 1 INJECTION, SOLUTION INTRAMUSCULAR; INTRAVENOUS; SUBCUTANEOUS at 14:22

## 2024-04-03 RX ADMIN — ENOXAPARIN SODIUM 30 MG: 100 INJECTION SUBCUTANEOUS at 20:03

## 2024-04-03 RX ADMIN — IOPAMIDOL 75 ML: 755 INJECTION, SOLUTION INTRAVENOUS at 02:09

## 2024-04-03 RX ADMIN — SODIUM CHLORIDE, PRESERVATIVE FREE 10 ML: 5 INJECTION INTRAVENOUS at 00:57

## 2024-04-03 RX ADMIN — ACETAMINOPHEN 650 MG: 325 TABLET ORAL at 06:32

## 2024-04-03 RX ADMIN — SODIUM CHLORIDE, POTASSIUM CHLORIDE, SODIUM LACTATE AND CALCIUM CHLORIDE: 600; 310; 30; 20 INJECTION, SOLUTION INTRAVENOUS at 14:24

## 2024-04-03 RX ADMIN — PANTOPRAZOLE SODIUM 40 MG: 40 INJECTION, POWDER, FOR SOLUTION INTRAVENOUS at 23:38

## 2024-04-03 RX ADMIN — ONDANSETRON 4 MG: 4 TABLET, ORALLY DISINTEGRATING ORAL at 01:00

## 2024-04-03 RX ADMIN — ACETAMINOPHEN 650 MG: 325 TABLET ORAL at 23:43

## 2024-04-03 ASSESSMENT — PAIN SCALES - GENERAL
PAINLEVEL_OUTOF10: 7
PAINLEVEL_OUTOF10: 8
PAINLEVEL_OUTOF10: 8
PAINLEVEL_OUTOF10: 9
PAINLEVEL_OUTOF10: 7
PAINLEVEL_OUTOF10: 9
PAINLEVEL_OUTOF10: 8
PAINLEVEL_OUTOF10: 7
PAINLEVEL_OUTOF10: 9
PAINLEVEL_OUTOF10: 8
PAINLEVEL_OUTOF10: 6
PAINLEVEL_OUTOF10: 7
PAINLEVEL_OUTOF10: 7
PAINLEVEL_OUTOF10: 5
PAINLEVEL_OUTOF10: 5
PAINLEVEL_OUTOF10: 7
PAINLEVEL_OUTOF10: 8

## 2024-04-03 ASSESSMENT — PAIN DESCRIPTION - PAIN TYPE
TYPE: ACUTE PAIN

## 2024-04-03 ASSESSMENT — PAIN DESCRIPTION - ORIENTATION
ORIENTATION: LEFT

## 2024-04-03 ASSESSMENT — PAIN DESCRIPTION - LOCATION
LOCATION: CHEST;SHOULDER
LOCATION: ARM;SHOULDER;HEAD
LOCATION: SHOULDER
LOCATION: CHEST;SHOULDER
LOCATION: SHOULDER
LOCATION: SHOULDER;HEAD

## 2024-04-03 ASSESSMENT — PAIN DESCRIPTION - FREQUENCY
FREQUENCY: CONTINUOUS

## 2024-04-03 ASSESSMENT — PAIN DESCRIPTION - ONSET
ONSET: ON-GOING

## 2024-04-03 ASSESSMENT — PAIN DESCRIPTION - DESCRIPTORS
DESCRIPTORS: SORE
DESCRIPTORS: SORE
DESCRIPTORS: ACHING
DESCRIPTORS: ACHING;SORE
DESCRIPTORS: SORE
DESCRIPTORS: ACHING;SORE
DESCRIPTORS: ACHING
DESCRIPTORS: ACHING

## 2024-04-03 ASSESSMENT — PAIN SCALES - WONG BAKER
WONGBAKER_NUMERICALRESPONSE: NO HURT
WONGBAKER_NUMERICALRESPONSE: NO HURT

## 2024-04-03 ASSESSMENT — PAIN - FUNCTIONAL ASSESSMENT
PAIN_FUNCTIONAL_ASSESSMENT: ACTIVITIES ARE NOT PREVENTED

## 2024-04-03 NOTE — CARE COORDINATION
04/03/24 1125   Service Assessment   Patient Orientation Alert and Oriented;Person;Place;Situation   Cognition Alert   History Provided By Patient   Primary Caregiver Self   Support Systems Parent   Patient's Healthcare Decision Maker is: Legal Next of Kin   PCP Verified by CM Yes   Last Visit to PCP Within last year   Prior Functional Level Independent in ADLs/IADLs   Current Functional Level Independent in ADLs/IADLs   Can patient return to prior living arrangement Yes   Ability to make needs known: Good   Family able to assist with home care needs: Yes  (pts father is described as a support to pt.)   Condition of Participation: Discharge Planning   The Patient and/Or Patient Representative agree with the Discharge Plan? Yes        04/03/24 1125   Service Assessment   Patient Orientation Alert and Oriented;Person;Place;Situation   Cognition Alert   History Provided By Patient   Primary Caregiver Self   Support Systems Parent   Patient's Healthcare Decision Maker is: Legal Next of Kin   PCP Verified by CM Yes   Last Visit to PCP Within last year   Prior Functional Level Independent in ADLs/IADLs   Current Functional Level Independent in ADLs/IADLs   Can patient return to prior living arrangement Yes   Ability to make needs known: Good   Family able to assist with home care needs: Yes  (pts father is described as a support to pt.)   Condition of Participation: Discharge Planning   The Patient and/Or Patient Representative agree with the Discharge Plan? Yes     CM into see pt to initiate a safe discharge plan. Cm  introduced self and explained role of CM. Pt is kind, alert and oriented. Pt lives in  a two story home alone except every other week he has his children. Ages 4,9,12,14,and 17. Pt is typically very independent. Pt has a PCP and insurance and able to obtain his prescriptions. Pt uses the [facility]. Pt is supported by his dad.   Discharge plan is for pt to return home alone. Supported by dad.

## 2024-04-03 NOTE — H&P
History and Physical      Name:  Jc Morris /Age/Sex: 1980  (43 y.o. male)   MRN & CSN:  [id number] & [id number] Encounter Date/Time:2024 10:34 PM EDT   Location:  [address] [place] [address] PCP: Mitzi Gibson PA       Assessment and Plan:   Jc Morris is a 43 y.o. male with anxiety disorder and Left shoulder arthroplasty in 2023 presented as a transfer from  [facility] for evaluation and management of left shoulder pain, fever and headache.    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 Shoulder vs clinically less likely GAS infection   XR and CT soft tissue neck report reviewed, negative for acute abscess, adenopathy, misalignment of joint prosthesis or effusion. Lactic acid 1.2.  IV Vancomycin and IV Rocephin  Follow-up blood cultures   Pain control as needed  CT left shoulder given exquisite tenderness and significantly reduced ROM    Generalized Anxiety disorder  Continue home Wellbutrin    Obesity/BMI 33.40  Counseled on dietary modification and weight loss    Inpatient MedSurg  Full code    Disposition:     Current Living situation: Home  Expected Disposition: Home  Estimated D/C: 2-3 days    Diet ADULT DIET; Regular   DVT Prophylaxis [x] Lovenox, []  Heparin, [] SCDs, [] Ambulation,  [] Eliquis, [] Xarelto, [] Coumadin   Code Status Full Code   Surrogate Decision Maker/ ALLI Alex     Personally reviewed Lab Studies and Imaging   Discussed management of the case with ER provider at  ER who recommended admission for sepsis secondary to strep throat.  Drugs that require monitoring for toxicity include IV vancomycin and the method of monitoring was level.    History from:     Patient     History of Present Illness:     Chief Complaint: Left shoulder pain fever and headache    Jc Morris is a 43 y.o. male with anxiety disorder and Left shoulder arthroplasty in 2023 presented as a transfer from  [facility] for evaluation and management of left shoulder pain, fever and headache.Patient

## 2024-04-04 LAB
ANION GAP SERPL CALCULATED.3IONS-SCNC: 11 MMOL/L (ref 7–16)
BASOPHILS ABSOLUTE: 0.1 K/CU MM
BASOPHILS RELATIVE PERCENT: 0.3 % (ref 0–1)
BUN SERPL-MCNC: 12 MG/DL (ref 6–23)
CALCIUM SERPL-MCNC: 8 MG/DL (ref 8.3–10.6)
CHLORIDE BLD-SCNC: 101 MMOL/L (ref 99–110)
CO2: 20 MMOL/L (ref 21–32)
CREAT SERPL-MCNC: 1.1 MG/DL (ref 0.9–1.3)
DIFFERENTIAL TYPE: ABNORMAL
EOSINOPHILS ABSOLUTE: 0.1 K/CU MM
EOSINOPHILS RELATIVE PERCENT: 0.3 % (ref 0–3)
GFR SERPL CREATININE-BSD FRML MDRD: 85 ML/MIN/1.73M2
GLUCOSE SERPL-MCNC: 104 MG/DL (ref 70–99)
HCT VFR BLD CALC: 43.9 % (ref 42–52)
HEMOGLOBIN: 14.7 GM/DL (ref 13.5–18)
HIGH SENSITIVE C-REACTIVE PROTEIN: 276.8 MG/L (ref 0–5)
IMMATURE NEUTROPHIL %: 1 % (ref 0–0.43)
LYMPHOCYTES ABSOLUTE: 1.8 K/CU MM
LYMPHOCYTES RELATIVE PERCENT: 10.3 % (ref 24–44)
MCH RBC QN AUTO: 32.2 PG (ref 27–31)
MCHC RBC AUTO-ENTMCNC: 33.5 % (ref 32–36)
MCV RBC AUTO: 96.1 FL (ref 78–100)
MONOCYTES ABSOLUTE: 1 K/CU MM
MONOCYTES RELATIVE PERCENT: 6 % (ref 0–4)
NUCLEATED RBC %: 0 %
PDW BLD-RTO: 13.1 % (ref 11.7–14.9)
PLATELET # BLD: 163 K/CU MM (ref 140–440)
PMV BLD AUTO: 8.7 FL (ref 7.5–11.1)
POTASSIUM SERPL-SCNC: 4 MMOL/L (ref 3.5–5.1)
RBC # BLD: 4.57 M/CU MM (ref 4.6–6.2)
SEGMENTED NEUTROPHILS ABSOLUTE COUNT: 14.2 K/CU MM
SEGMENTED NEUTROPHILS RELATIVE PERCENT: 82.1 % (ref 36–66)
SODIUM BLD-SCNC: 132 MMOL/L (ref 135–145)
TOTAL IMMATURE NEUTOROPHIL: 0.18 K/CU MM
TOTAL NUCLEATED RBC: 0 K/CU MM
WBC # BLD: 17.3 K/CU MM (ref 4–10.5)

## 2024-04-04 PROCEDURE — 6370000000 HC RX 637 (ALT 250 FOR IP): Performed by: STUDENT IN AN ORGANIZED HEALTH CARE EDUCATION/TRAINING PROGRAM

## 2024-04-04 PROCEDURE — 6360000002 HC RX W HCPCS: Performed by: STUDENT IN AN ORGANIZED HEALTH CARE EDUCATION/TRAINING PROGRAM

## 2024-04-04 PROCEDURE — 85025 COMPLETE CBC W/AUTO DIFF WBC: CPT

## 2024-04-04 PROCEDURE — 6370000000 HC RX 637 (ALT 250 FOR IP): Performed by: FAMILY MEDICINE

## 2024-04-04 PROCEDURE — 80048 BASIC METABOLIC PNL TOTAL CA: CPT

## 2024-04-04 PROCEDURE — 86141 C-REACTIVE PROTEIN HS: CPT

## 2024-04-04 PROCEDURE — 36415 COLL VENOUS BLD VENIPUNCTURE: CPT

## 2024-04-04 PROCEDURE — 86611 BARTONELLA ANTIBODY: CPT

## 2024-04-04 PROCEDURE — 99221 1ST HOSP IP/OBS SF/LOW 40: CPT | Performed by: STUDENT IN AN ORGANIZED HEALTH CARE EDUCATION/TRAINING PROGRAM

## 2024-04-04 PROCEDURE — 94761 N-INVAS EAR/PLS OXIMETRY MLT: CPT

## 2024-04-04 PROCEDURE — 1200000000 HC SEMI PRIVATE

## 2024-04-04 PROCEDURE — 2580000003 HC RX 258: Performed by: STUDENT IN AN ORGANIZED HEALTH CARE EDUCATION/TRAINING PROGRAM

## 2024-04-04 RX ORDER — CYCLOBENZAPRINE HCL 10 MG
10 TABLET ORAL 3 TIMES DAILY
Status: DISCONTINUED | OUTPATIENT
Start: 2024-04-04 | End: 2024-04-08 | Stop reason: HOSPADM

## 2024-04-04 RX ORDER — OXYCODONE HYDROCHLORIDE AND ACETAMINOPHEN 5; 325 MG/1; MG/1
1 TABLET ORAL EVERY 6 HOURS PRN
Status: DISCONTINUED | OUTPATIENT
Start: 2024-04-04 | End: 2024-04-06

## 2024-04-04 RX ORDER — OXYCODONE HYDROCHLORIDE AND ACETAMINOPHEN 5; 325 MG/1; MG/1
1 TABLET ORAL EVERY 6 HOURS PRN
Status: DISCONTINUED | OUTPATIENT
Start: 2024-04-04 | End: 2024-04-08 | Stop reason: HOSPADM

## 2024-04-04 RX ORDER — DOXYCYCLINE HYCLATE 100 MG
100 TABLET ORAL EVERY 12 HOURS SCHEDULED
Status: DISCONTINUED | OUTPATIENT
Start: 2024-04-04 | End: 2024-04-08

## 2024-04-04 RX ADMIN — BUPROPION HYDROCHLORIDE 100 MG: 100 TABLET ORAL at 08:38

## 2024-04-04 RX ADMIN — BUPROPION HYDROCHLORIDE 100 MG: 100 TABLET ORAL at 21:26

## 2024-04-04 RX ADMIN — SODIUM CHLORIDE, PRESERVATIVE FREE 10 ML: 5 INJECTION INTRAVENOUS at 08:39

## 2024-04-04 RX ADMIN — OXYCODONE AND ACETAMINOPHEN 1 TABLET: 5; 325 TABLET ORAL at 15:52

## 2024-04-04 RX ADMIN — ENOXAPARIN SODIUM 30 MG: 100 INJECTION SUBCUTANEOUS at 08:39

## 2024-04-04 RX ADMIN — CYCLOBENZAPRINE HYDROCHLORIDE 10 MG: 10 TABLET, FILM COATED ORAL at 08:37

## 2024-04-04 RX ADMIN — OXYCODONE AND ACETAMINOPHEN 1 TABLET: 5; 325 TABLET ORAL at 08:38

## 2024-04-04 RX ADMIN — CYCLOBENZAPRINE HYDROCHLORIDE 10 MG: 10 TABLET, FILM COATED ORAL at 14:06

## 2024-04-04 RX ADMIN — SODIUM CHLORIDE, PRESERVATIVE FREE 10 ML: 5 INJECTION INTRAVENOUS at 21:28

## 2024-04-04 RX ADMIN — CYCLOBENZAPRINE HYDROCHLORIDE 10 MG: 10 TABLET, FILM COATED ORAL at 21:26

## 2024-04-04 RX ADMIN — ENOXAPARIN SODIUM 30 MG: 100 INJECTION SUBCUTANEOUS at 21:27

## 2024-04-04 RX ADMIN — CEFTRIAXONE SODIUM 2000 MG: 2 INJECTION, POWDER, FOR SOLUTION INTRAMUSCULAR; INTRAVENOUS at 17:19

## 2024-04-04 RX ADMIN — OXYCODONE AND ACETAMINOPHEN 1 TABLET: 5; 325 TABLET ORAL at 22:16

## 2024-04-04 RX ADMIN — OXYCODONE HYDROCHLORIDE 5 MG: 5 TABLET ORAL at 05:25

## 2024-04-04 RX ADMIN — DOXYCYCLINE HYCLATE 100 MG: 100 TABLET, COATED ORAL at 21:26

## 2024-04-04 ASSESSMENT — PAIN DESCRIPTION - ORIENTATION
ORIENTATION: LEFT;ANTERIOR
ORIENTATION: LEFT;ANTERIOR
ORIENTATION: RIGHT;MID
ORIENTATION: ANTERIOR
ORIENTATION: LEFT;ANTERIOR
ORIENTATION: ANTERIOR

## 2024-04-04 ASSESSMENT — PAIN DESCRIPTION - LOCATION
LOCATION: HEAD
LOCATION: HEAD;SHOULDER
LOCATION: HEAD
LOCATION: SHOULDER;HEAD
LOCATION: HEAD
LOCATION: HEAD;SHOULDER
LOCATION: SHOULDER
LOCATION: HEAD;SHOULDER

## 2024-04-04 ASSESSMENT — PAIN DESCRIPTION - FREQUENCY
FREQUENCY: CONTINUOUS

## 2024-04-04 ASSESSMENT — PAIN DESCRIPTION - PAIN TYPE
TYPE: ACUTE PAIN

## 2024-04-04 ASSESSMENT — PAIN DESCRIPTION - ONSET
ONSET: ON-GOING

## 2024-04-04 ASSESSMENT — PAIN SCALES - GENERAL
PAINLEVEL_OUTOF10: 7
PAINLEVEL_OUTOF10: 7
PAINLEVEL_OUTOF10: 10
PAINLEVEL_OUTOF10: 6
PAINLEVEL_OUTOF10: 5
PAINLEVEL_OUTOF10: 0
PAINLEVEL_OUTOF10: 7
PAINLEVEL_OUTOF10: 8
PAINLEVEL_OUTOF10: 7

## 2024-04-04 ASSESSMENT — PAIN - FUNCTIONAL ASSESSMENT
PAIN_FUNCTIONAL_ASSESSMENT: ACTIVITIES ARE NOT PREVENTED
PAIN_FUNCTIONAL_ASSESSMENT: PREVENTS OR INTERFERES SOME ACTIVE ACTIVITIES AND ADLS
PAIN_FUNCTIONAL_ASSESSMENT: ACTIVITIES ARE NOT PREVENTED

## 2024-04-04 ASSESSMENT — PAIN DESCRIPTION - DESCRIPTORS
DESCRIPTORS: POUNDING;SORE
DESCRIPTORS: ACHING;SORE
DESCRIPTORS: ACHING
DESCRIPTORS: SORE;POUNDING
DESCRIPTORS: ACHING
DESCRIPTORS: ACHING

## 2024-04-04 NOTE — PLAN OF CARE
Problem: Discharge Planning  Goal: Discharge to home or other facility with appropriate resources  4/4/2024 0338 by Sanford Wallace, RN  Outcome: Progressing     Problem: Pain  Goal: Verbalizes/displays adequate comfort level or baseline comfort level  4/4/2024 1100 by Diana Forte, RN  Outcome: Progressing  4/4/2024 0338 by Sanford Wallace, RN  Outcome: Progressing     Problem: Safety - Adult  Goal: Free from fall injury  4/4/2024 0338 by Sanford Wallace, RN  Outcome: Progressing

## 2024-04-05 LAB
ANION GAP SERPL CALCULATED.3IONS-SCNC: 12 MMOL/L (ref 7–16)
BASOPHILS ABSOLUTE: 0.1 K/CU MM
BASOPHILS RELATIVE PERCENT: 0.4 % (ref 0–1)
BUN SERPL-MCNC: 15 MG/DL (ref 6–23)
CALCIUM SERPL-MCNC: 8.1 MG/DL (ref 8.3–10.6)
CHLORIDE BLD-SCNC: 101 MMOL/L (ref 99–110)
CO2: 21 MMOL/L (ref 21–32)
CREAT SERPL-MCNC: 1 MG/DL (ref 0.9–1.3)
DIFFERENTIAL TYPE: ABNORMAL
EOSINOPHILS ABSOLUTE: 0.1 K/CU MM
EOSINOPHILS RELATIVE PERCENT: 0.9 % (ref 0–3)
GFR SERPL CREATININE-BSD FRML MDRD: >90 ML/MIN/1.73M2
GLUCOSE SERPL-MCNC: 105 MG/DL (ref 70–99)
HCT VFR BLD CALC: 46 % (ref 42–52)
HEMOGLOBIN: 15.1 GM/DL (ref 13.5–18)
HIGH SENSITIVE C-REACTIVE PROTEIN: 237.9 MG/L (ref 0–5)
IMMATURE NEUTROPHIL %: 0.9 % (ref 0–0.43)
LYMPHOCYTES ABSOLUTE: 1.8 K/CU MM
LYMPHOCYTES RELATIVE PERCENT: 15.4 % (ref 24–44)
MCH RBC QN AUTO: 31.4 PG (ref 27–31)
MCHC RBC AUTO-ENTMCNC: 32.8 % (ref 32–36)
MCV RBC AUTO: 95.6 FL (ref 78–100)
MONOCYTES ABSOLUTE: 1 K/CU MM
MONOCYTES RELATIVE PERCENT: 8.1 % (ref 0–4)
NUCLEATED RBC %: 0 %
PDW BLD-RTO: 12.9 % (ref 11.7–14.9)
PLATELET # BLD: 199 K/CU MM (ref 140–440)
PMV BLD AUTO: 9.5 FL (ref 7.5–11.1)
POTASSIUM SERPL-SCNC: 3.8 MMOL/L (ref 3.5–5.1)
RBC # BLD: 4.81 M/CU MM (ref 4.6–6.2)
SEGMENTED NEUTROPHILS ABSOLUTE COUNT: 8.7 K/CU MM
SEGMENTED NEUTROPHILS RELATIVE PERCENT: 74.3 % (ref 36–66)
SODIUM BLD-SCNC: 134 MMOL/L (ref 135–145)
TOTAL IMMATURE NEUTOROPHIL: 0.11 K/CU MM
TOTAL NUCLEATED RBC: 0 K/CU MM
WBC # BLD: 11.7 K/CU MM (ref 4–10.5)

## 2024-04-05 PROCEDURE — 86777 TOXOPLASMA ANTIBODY: CPT

## 2024-04-05 PROCEDURE — 80048 BASIC METABOLIC PNL TOTAL CA: CPT

## 2024-04-05 PROCEDURE — 6370000000 HC RX 637 (ALT 250 FOR IP): Performed by: STUDENT IN AN ORGANIZED HEALTH CARE EDUCATION/TRAINING PROGRAM

## 2024-04-05 PROCEDURE — 2580000003 HC RX 258: Performed by: STUDENT IN AN ORGANIZED HEALTH CARE EDUCATION/TRAINING PROGRAM

## 2024-04-05 PROCEDURE — 6370000000 HC RX 637 (ALT 250 FOR IP): Performed by: FAMILY MEDICINE

## 2024-04-05 PROCEDURE — 1200000000 HC SEMI PRIVATE

## 2024-04-05 PROCEDURE — 86611 BARTONELLA ANTIBODY: CPT

## 2024-04-05 PROCEDURE — 6360000002 HC RX W HCPCS: Performed by: STUDENT IN AN ORGANIZED HEALTH CARE EDUCATION/TRAINING PROGRAM

## 2024-04-05 PROCEDURE — 86778 TOXOPLASMA ANTIBODY IGM: CPT

## 2024-04-05 PROCEDURE — 86141 C-REACTIVE PROTEIN HS: CPT

## 2024-04-05 PROCEDURE — 94761 N-INVAS EAR/PLS OXIMETRY MLT: CPT

## 2024-04-05 PROCEDURE — 36415 COLL VENOUS BLD VENIPUNCTURE: CPT

## 2024-04-05 PROCEDURE — 85025 COMPLETE CBC W/AUTO DIFF WBC: CPT

## 2024-04-05 RX ADMIN — PANTOPRAZOLE SODIUM 40 MG: 40 TABLET, DELAYED RELEASE ORAL at 08:56

## 2024-04-05 RX ADMIN — CYCLOBENZAPRINE HYDROCHLORIDE 10 MG: 10 TABLET, FILM COATED ORAL at 14:06

## 2024-04-05 RX ADMIN — BUPROPION HYDROCHLORIDE 100 MG: 100 TABLET ORAL at 08:58

## 2024-04-05 RX ADMIN — OXYCODONE AND ACETAMINOPHEN 1 TABLET: 5; 325 TABLET ORAL at 21:37

## 2024-04-05 RX ADMIN — OXYCODONE AND ACETAMINOPHEN 1 TABLET: 5; 325 TABLET ORAL at 14:56

## 2024-04-05 RX ADMIN — ENOXAPARIN SODIUM 30 MG: 100 INJECTION SUBCUTANEOUS at 08:59

## 2024-04-05 RX ADMIN — BUPROPION HYDROCHLORIDE 100 MG: 100 TABLET ORAL at 21:33

## 2024-04-05 RX ADMIN — CYCLOBENZAPRINE HYDROCHLORIDE 10 MG: 10 TABLET, FILM COATED ORAL at 21:33

## 2024-04-05 RX ADMIN — CYCLOBENZAPRINE HYDROCHLORIDE 10 MG: 10 TABLET, FILM COATED ORAL at 08:58

## 2024-04-05 RX ADMIN — SODIUM CHLORIDE, PRESERVATIVE FREE 10 ML: 5 INJECTION INTRAVENOUS at 08:59

## 2024-04-05 RX ADMIN — DOXYCYCLINE HYCLATE 100 MG: 100 TABLET, COATED ORAL at 21:33

## 2024-04-05 RX ADMIN — ENOXAPARIN SODIUM 30 MG: 100 INJECTION SUBCUTANEOUS at 21:33

## 2024-04-05 RX ADMIN — SODIUM CHLORIDE, PRESERVATIVE FREE 10 ML: 5 INJECTION INTRAVENOUS at 21:38

## 2024-04-05 RX ADMIN — OXYCODONE AND ACETAMINOPHEN 1 TABLET: 5; 325 TABLET ORAL at 08:57

## 2024-04-05 RX ADMIN — DOXYCYCLINE HYCLATE 100 MG: 100 TABLET, COATED ORAL at 08:59

## 2024-04-05 ASSESSMENT — PAIN SCALES - GENERAL
PAINLEVEL_OUTOF10: 6
PAINLEVEL_OUTOF10: 7
PAINLEVEL_OUTOF10: 7
PAINLEVEL_OUTOF10: 6
PAINLEVEL_OUTOF10: 7

## 2024-04-05 ASSESSMENT — PAIN DESCRIPTION - FREQUENCY
FREQUENCY: CONTINUOUS
FREQUENCY: CONTINUOUS

## 2024-04-05 ASSESSMENT — PAIN DESCRIPTION - LOCATION
LOCATION: SHOULDER;BACK;NECK
LOCATION: SHOULDER;NECK;BACK
LOCATION: SHOULDER
LOCATION: SHOULDER;NECK;BACK
LOCATION: SHOULDER

## 2024-04-05 ASSESSMENT — PAIN DESCRIPTION - ONSET
ONSET: ON-GOING
ONSET: PROGRESSIVE

## 2024-04-05 ASSESSMENT — PAIN SCALES - WONG BAKER
WONGBAKER_NUMERICALRESPONSE: NO HURT

## 2024-04-05 ASSESSMENT — PAIN DESCRIPTION - ORIENTATION
ORIENTATION: LEFT

## 2024-04-05 ASSESSMENT — PAIN DESCRIPTION - PAIN TYPE: TYPE: ACUTE PAIN

## 2024-04-05 ASSESSMENT — PAIN DESCRIPTION - DESCRIPTORS: DESCRIPTORS: ACHING;SORE

## 2024-04-06 LAB
ANION GAP SERPL CALCULATED.3IONS-SCNC: 13 MMOL/L (ref 7–16)
BASOPHILS ABSOLUTE: 0.1 K/CU MM
BASOPHILS RELATIVE PERCENT: 0.7 % (ref 0–1)
BUN SERPL-MCNC: 18 MG/DL (ref 6–23)
CALCIUM SERPL-MCNC: 8.4 MG/DL (ref 8.3–10.6)
CHLORIDE BLD-SCNC: 102 MMOL/L (ref 99–110)
CO2: 21 MMOL/L (ref 21–32)
CREAT SERPL-MCNC: 1.2 MG/DL (ref 0.9–1.3)
DIFFERENTIAL TYPE: ABNORMAL
EOSINOPHILS ABSOLUTE: 0.2 K/CU MM
EOSINOPHILS RELATIVE PERCENT: 1.7 % (ref 0–3)
GFR SERPL CREATININE-BSD FRML MDRD: 76 ML/MIN/1.73M2
GLUCOSE SERPL-MCNC: 93 MG/DL (ref 70–99)
HCT VFR BLD CALC: 44.9 % (ref 42–52)
HEMOGLOBIN: 14.9 GM/DL (ref 13.5–18)
HIGH SENSITIVE C-REACTIVE PROTEIN: 117.5 MG/L (ref 0–5)
IMMATURE NEUTROPHIL %: 0.7 % (ref 0–0.43)
LYMPHOCYTES ABSOLUTE: 2.2 K/CU MM
LYMPHOCYTES RELATIVE PERCENT: 22.8 % (ref 24–44)
MCH RBC QN AUTO: 31.2 PG (ref 27–31)
MCHC RBC AUTO-ENTMCNC: 33.2 % (ref 32–36)
MCV RBC AUTO: 94.1 FL (ref 78–100)
MONOCYTES ABSOLUTE: 0.8 K/CU MM
MONOCYTES RELATIVE PERCENT: 8.3 % (ref 0–4)
NUCLEATED RBC %: 0 %
PDW BLD-RTO: 12.7 % (ref 11.7–14.9)
PLATELET # BLD: 267 K/CU MM (ref 140–440)
PMV BLD AUTO: 9.2 FL (ref 7.5–11.1)
POTASSIUM SERPL-SCNC: 3.7 MMOL/L (ref 3.5–5.1)
RBC # BLD: 4.77 M/CU MM (ref 4.6–6.2)
SEGMENTED NEUTROPHILS ABSOLUTE COUNT: 6.5 K/CU MM
SEGMENTED NEUTROPHILS RELATIVE PERCENT: 65.8 % (ref 36–66)
SODIUM BLD-SCNC: 136 MMOL/L (ref 135–145)
TOTAL IMMATURE NEUTOROPHIL: 0.07 K/CU MM
TOTAL NUCLEATED RBC: 0 K/CU MM
WBC # BLD: 9.8 K/CU MM (ref 4–10.5)

## 2024-04-06 PROCEDURE — 6370000000 HC RX 637 (ALT 250 FOR IP): Performed by: STUDENT IN AN ORGANIZED HEALTH CARE EDUCATION/TRAINING PROGRAM

## 2024-04-06 PROCEDURE — 94761 N-INVAS EAR/PLS OXIMETRY MLT: CPT

## 2024-04-06 PROCEDURE — 1200000000 HC SEMI PRIVATE

## 2024-04-06 PROCEDURE — 80048 BASIC METABOLIC PNL TOTAL CA: CPT

## 2024-04-06 PROCEDURE — 6360000002 HC RX W HCPCS: Performed by: STUDENT IN AN ORGANIZED HEALTH CARE EDUCATION/TRAINING PROGRAM

## 2024-04-06 PROCEDURE — 2580000003 HC RX 258: Performed by: STUDENT IN AN ORGANIZED HEALTH CARE EDUCATION/TRAINING PROGRAM

## 2024-04-06 PROCEDURE — 6370000000 HC RX 637 (ALT 250 FOR IP): Performed by: FAMILY MEDICINE

## 2024-04-06 PROCEDURE — 86141 C-REACTIVE PROTEIN HS: CPT

## 2024-04-06 PROCEDURE — 85025 COMPLETE CBC W/AUTO DIFF WBC: CPT

## 2024-04-06 PROCEDURE — 36415 COLL VENOUS BLD VENIPUNCTURE: CPT

## 2024-04-06 RX ORDER — ACETAMINOPHEN 325 MG/1
650 TABLET ORAL EVERY 6 HOURS PRN
Status: DISCONTINUED | OUTPATIENT
Start: 2024-04-06 | End: 2024-04-08 | Stop reason: HOSPADM

## 2024-04-06 RX ORDER — ACETAMINOPHEN 650 MG/1
650 SUPPOSITORY RECTAL EVERY 6 HOURS PRN
Status: DISCONTINUED | OUTPATIENT
Start: 2024-04-06 | End: 2024-04-08 | Stop reason: HOSPADM

## 2024-04-06 RX ADMIN — OXYCODONE AND ACETAMINOPHEN 1 TABLET: 5; 325 TABLET ORAL at 17:05

## 2024-04-06 RX ADMIN — OXYCODONE AND ACETAMINOPHEN 1 TABLET: 5; 325 TABLET ORAL at 23:38

## 2024-04-06 RX ADMIN — SODIUM CHLORIDE, PRESERVATIVE FREE 10 ML: 5 INJECTION INTRAVENOUS at 20:22

## 2024-04-06 RX ADMIN — DOXYCYCLINE HYCLATE 100 MG: 100 TABLET, COATED ORAL at 08:37

## 2024-04-06 RX ADMIN — SODIUM CHLORIDE, PRESERVATIVE FREE 10 ML: 5 INJECTION INTRAVENOUS at 08:37

## 2024-04-06 RX ADMIN — CYCLOBENZAPRINE HYDROCHLORIDE 10 MG: 10 TABLET, FILM COATED ORAL at 20:22

## 2024-04-06 RX ADMIN — CYCLOBENZAPRINE HYDROCHLORIDE 10 MG: 10 TABLET, FILM COATED ORAL at 14:29

## 2024-04-06 RX ADMIN — CYCLOBENZAPRINE HYDROCHLORIDE 10 MG: 10 TABLET, FILM COATED ORAL at 08:36

## 2024-04-06 RX ADMIN — DOXYCYCLINE HYCLATE 100 MG: 100 TABLET, COATED ORAL at 20:22

## 2024-04-06 RX ADMIN — ENOXAPARIN SODIUM 30 MG: 100 INJECTION SUBCUTANEOUS at 20:22

## 2024-04-06 RX ADMIN — BUPROPION HYDROCHLORIDE 100 MG: 100 TABLET ORAL at 08:37

## 2024-04-06 RX ADMIN — ENOXAPARIN SODIUM 30 MG: 100 INJECTION SUBCUTANEOUS at 08:36

## 2024-04-06 RX ADMIN — PANTOPRAZOLE SODIUM 40 MG: 40 TABLET, DELAYED RELEASE ORAL at 06:34

## 2024-04-06 RX ADMIN — BUPROPION HYDROCHLORIDE 100 MG: 100 TABLET ORAL at 20:22

## 2024-04-06 RX ADMIN — OXYCODONE AND ACETAMINOPHEN 1 TABLET: 5; 325 TABLET ORAL at 06:38

## 2024-04-06 ASSESSMENT — PAIN SCALES - GENERAL
PAINLEVEL_OUTOF10: 5
PAINLEVEL_OUTOF10: 7
PAINLEVEL_OUTOF10: 7
PAINLEVEL_OUTOF10: 5

## 2024-04-06 ASSESSMENT — PAIN DESCRIPTION - ORIENTATION
ORIENTATION: LEFT

## 2024-04-06 ASSESSMENT — PAIN SCALES - WONG BAKER: WONGBAKER_NUMERICALRESPONSE: HURTS LITTLE MORE

## 2024-04-06 ASSESSMENT — PAIN DESCRIPTION - LOCATION
LOCATION: CHEST;SHOULDER
LOCATION: SHOULDER

## 2024-04-06 ASSESSMENT — PAIN DESCRIPTION - DESCRIPTORS
DESCRIPTORS: THROBBING
DESCRIPTORS: DISCOMFORT;THROBBING
DESCRIPTORS: ACHING

## 2024-04-07 LAB
B HENSELAE IGG TITR SER IF: NORMAL {TITER}
B HENSELAE IGM TITR SER IF: NORMAL {TITER}
BASOPHILS ABSOLUTE: 0.1 K/CU MM
BASOPHILS RELATIVE PERCENT: 0.8 % (ref 0–1)
DIFFERENTIAL TYPE: ABNORMAL
EOSINOPHILS ABSOLUTE: 0.2 K/CU MM
EOSINOPHILS RELATIVE PERCENT: 1.8 % (ref 0–3)
HCT VFR BLD CALC: 45.9 % (ref 42–52)
HEMOGLOBIN: 15.6 GM/DL (ref 13.5–18)
IMMATURE NEUTROPHIL %: 1.2 % (ref 0–0.43)
LYMPHOCYTES ABSOLUTE: 2.4 K/CU MM
LYMPHOCYTES RELATIVE PERCENT: 26.3 % (ref 24–44)
MCH RBC QN AUTO: 31.6 PG (ref 27–31)
MCHC RBC AUTO-ENTMCNC: 34 % (ref 32–36)
MCV RBC AUTO: 92.9 FL (ref 78–100)
MONOCYTES ABSOLUTE: 0.8 K/CU MM
MONOCYTES RELATIVE PERCENT: 8.9 % (ref 0–4)
NUCLEATED RBC %: 0 %
PDW BLD-RTO: 12.8 % (ref 11.7–14.9)
PLATELET # BLD: 299 K/CU MM (ref 140–440)
PMV BLD AUTO: 8.6 FL (ref 7.5–11.1)
RBC # BLD: 4.94 M/CU MM (ref 4.6–6.2)
SEGMENTED NEUTROPHILS ABSOLUTE COUNT: 5.5 K/CU MM
SEGMENTED NEUTROPHILS RELATIVE PERCENT: 61 % (ref 36–66)
T GONDII IGG SER-ACNC: <3 IU/ML
T GONDII IGM SER IA-ACNC: <3 AU/ML
TOTAL IMMATURE NEUTOROPHIL: 0.11 K/CU MM
TOTAL NUCLEATED RBC: 0 K/CU MM
WBC # BLD: 9 K/CU MM (ref 4–10.5)

## 2024-04-07 PROCEDURE — 2580000003 HC RX 258: Performed by: STUDENT IN AN ORGANIZED HEALTH CARE EDUCATION/TRAINING PROGRAM

## 2024-04-07 PROCEDURE — 6370000000 HC RX 637 (ALT 250 FOR IP): Performed by: STUDENT IN AN ORGANIZED HEALTH CARE EDUCATION/TRAINING PROGRAM

## 2024-04-07 PROCEDURE — 36415 COLL VENOUS BLD VENIPUNCTURE: CPT

## 2024-04-07 PROCEDURE — 85025 COMPLETE CBC W/AUTO DIFF WBC: CPT

## 2024-04-07 PROCEDURE — 6360000002 HC RX W HCPCS: Performed by: STUDENT IN AN ORGANIZED HEALTH CARE EDUCATION/TRAINING PROGRAM

## 2024-04-07 PROCEDURE — 6370000000 HC RX 637 (ALT 250 FOR IP): Performed by: FAMILY MEDICINE

## 2024-04-07 PROCEDURE — 1200000000 HC SEMI PRIVATE

## 2024-04-07 PROCEDURE — 94761 N-INVAS EAR/PLS OXIMETRY MLT: CPT

## 2024-04-07 RX ADMIN — ENOXAPARIN SODIUM 30 MG: 100 INJECTION SUBCUTANEOUS at 20:34

## 2024-04-07 RX ADMIN — OXYCODONE AND ACETAMINOPHEN 1 TABLET: 5; 325 TABLET ORAL at 23:00

## 2024-04-07 RX ADMIN — SODIUM CHLORIDE, PRESERVATIVE FREE 10 ML: 5 INJECTION INTRAVENOUS at 10:45

## 2024-04-07 RX ADMIN — CYCLOBENZAPRINE HYDROCHLORIDE 10 MG: 10 TABLET, FILM COATED ORAL at 15:08

## 2024-04-07 RX ADMIN — OXYCODONE AND ACETAMINOPHEN 1 TABLET: 5; 325 TABLET ORAL at 16:57

## 2024-04-07 RX ADMIN — BUPROPION HYDROCHLORIDE 100 MG: 100 TABLET ORAL at 20:34

## 2024-04-07 RX ADMIN — OXYCODONE AND ACETAMINOPHEN 1 TABLET: 5; 325 TABLET ORAL at 10:33

## 2024-04-07 RX ADMIN — PANTOPRAZOLE SODIUM 40 MG: 40 TABLET, DELAYED RELEASE ORAL at 10:34

## 2024-04-07 RX ADMIN — CYCLOBENZAPRINE HYDROCHLORIDE 10 MG: 10 TABLET, FILM COATED ORAL at 10:34

## 2024-04-07 RX ADMIN — DOXYCYCLINE HYCLATE 100 MG: 100 TABLET, COATED ORAL at 20:34

## 2024-04-07 RX ADMIN — CYCLOBENZAPRINE HYDROCHLORIDE 10 MG: 10 TABLET, FILM COATED ORAL at 20:34

## 2024-04-07 RX ADMIN — BUPROPION HYDROCHLORIDE 100 MG: 100 TABLET ORAL at 10:34

## 2024-04-07 RX ADMIN — ENOXAPARIN SODIUM 30 MG: 100 INJECTION SUBCUTANEOUS at 10:45

## 2024-04-07 RX ADMIN — DOXYCYCLINE HYCLATE 100 MG: 100 TABLET, COATED ORAL at 10:33

## 2024-04-07 RX ADMIN — SODIUM CHLORIDE, PRESERVATIVE FREE 10 ML: 5 INJECTION INTRAVENOUS at 20:34

## 2024-04-07 ASSESSMENT — PAIN DESCRIPTION - LOCATION
LOCATION: SHOULDER
LOCATION: BACK

## 2024-04-07 ASSESSMENT — PAIN SCALES - GENERAL
PAINLEVEL_OUTOF10: 7
PAINLEVEL_OUTOF10: 6
PAINLEVEL_OUTOF10: 0
PAINLEVEL_OUTOF10: 7
PAINLEVEL_OUTOF10: 0
PAINLEVEL_OUTOF10: 6
PAINLEVEL_OUTOF10: 7

## 2024-04-07 ASSESSMENT — PAIN DESCRIPTION - DESCRIPTORS
DESCRIPTORS: ACHING;DISCOMFORT
DESCRIPTORS: ACHING
DESCRIPTORS: ACHING
DESCRIPTORS: ACHING;DISCOMFORT;TENDER;TIGHTNESS

## 2024-04-07 ASSESSMENT — PAIN DESCRIPTION - ORIENTATION
ORIENTATION: LEFT
ORIENTATION: LOWER
ORIENTATION: LEFT
ORIENTATION: LEFT

## 2024-04-07 ASSESSMENT — PAIN DESCRIPTION - ONSET
ONSET: PROGRESSIVE
ONSET: ON-GOING
ONSET: PROGRESSIVE
ONSET: ON-GOING

## 2024-04-07 ASSESSMENT — PAIN SCALES - WONG BAKER
WONGBAKER_NUMERICALRESPONSE: NO HURT

## 2024-04-07 ASSESSMENT — PAIN - FUNCTIONAL ASSESSMENT
PAIN_FUNCTIONAL_ASSESSMENT: PREVENTS OR INTERFERES SOME ACTIVE ACTIVITIES AND ADLS

## 2024-04-07 ASSESSMENT — PAIN DESCRIPTION - PAIN TYPE
TYPE: CHRONIC PAIN;ACUTE PAIN
TYPE: ACUTE PAIN
TYPE: ACUTE PAIN
TYPE: CHRONIC PAIN;ACUTE PAIN

## 2024-04-07 ASSESSMENT — PAIN DESCRIPTION - FREQUENCY
FREQUENCY: INTERMITTENT

## 2024-04-07 NOTE — PLAN OF CARE
Problem: Discharge Planning  Goal: Discharge to home or other facility with appropriate resources  Outcome: Progressing  Flowsheets (Taken 4/7/2024 0800)  Discharge to home or other facility with appropriate resources:   Identify barriers to discharge with patient and caregiver   Arrange for needed discharge resources and transportation as appropriate   Identify discharge learning needs (meds, wound care, etc)   Arrange for interpreters to assist at discharge as needed   Refer to discharge planning if patient needs post-hospital services based on physician order or complex needs related to functional status, cognitive ability or social support system     Problem: Pain  Goal: Verbalizes/displays adequate comfort level or baseline comfort level  Outcome: Progressing     Problem: Safety - Adult  Goal: Free from fall injury  Outcome: Progressing

## 2024-04-08 VITALS
RESPIRATION RATE: 20 BRPM | DIASTOLIC BLOOD PRESSURE: 83 MMHG | SYSTOLIC BLOOD PRESSURE: 137 MMHG | BODY MASS INDEX: 32.61 KG/M2 | WEIGHT: 240.74 LBS | TEMPERATURE: 97.3 F | OXYGEN SATURATION: 99 % | HEART RATE: 72 BPM | HEIGHT: 72 IN

## 2024-04-08 LAB
ALBUMIN SERPL-MCNC: 3.5 GM/DL (ref 3.4–5)
ALP BLD-CCNC: 142 IU/L (ref 40–128)
ALT SERPL-CCNC: 41 U/L (ref 10–40)
ANION GAP SERPL CALCULATED.3IONS-SCNC: 10 MMOL/L (ref 7–16)
AST SERPL-CCNC: 45 IU/L (ref 15–37)
BASOPHILS ABSOLUTE: 0.1 K/CU MM
BASOPHILS RELATIVE PERCENT: 0.7 % (ref 0–1)
BILIRUB SERPL-MCNC: 0.3 MG/DL (ref 0–1)
BUN SERPL-MCNC: 18 MG/DL (ref 6–23)
CALCIUM SERPL-MCNC: 8.6 MG/DL (ref 8.3–10.6)
CHLORIDE BLD-SCNC: 104 MMOL/L (ref 99–110)
CO2: 26 MMOL/L (ref 21–32)
CREAT SERPL-MCNC: 1.1 MG/DL (ref 0.9–1.3)
CRP SERPL HS-MCNC: 16.7 MG/L
CULTURE: NORMAL
CULTURE: NORMAL
DIFFERENTIAL TYPE: ABNORMAL
EOSINOPHILS ABSOLUTE: 0.2 K/CU MM
EOSINOPHILS RELATIVE PERCENT: 1.6 % (ref 0–3)
GFR SERPL CREATININE-BSD FRML MDRD: 85 ML/MIN/1.73M2
GLUCOSE SERPL-MCNC: 86 MG/DL (ref 70–99)
HCT VFR BLD CALC: 44.7 % (ref 42–52)
HEMOGLOBIN: 15.1 GM/DL (ref 13.5–18)
IMMATURE NEUTROPHIL %: 2.2 % (ref 0–0.43)
LYMPHOCYTES ABSOLUTE: 3 K/CU MM
LYMPHOCYTES RELATIVE PERCENT: 32.2 % (ref 24–44)
Lab: NORMAL
Lab: NORMAL
MCH RBC QN AUTO: 31.6 PG (ref 27–31)
MCHC RBC AUTO-ENTMCNC: 33.8 % (ref 32–36)
MCV RBC AUTO: 93.5 FL (ref 78–100)
MONOCYTES ABSOLUTE: 0.7 K/CU MM
MONOCYTES RELATIVE PERCENT: 7 % (ref 0–4)
NUCLEATED RBC %: 0 %
PDW BLD-RTO: 12.5 % (ref 11.7–14.9)
PLATELET # BLD: 323 K/CU MM (ref 140–440)
PMV BLD AUTO: 8.8 FL (ref 7.5–11.1)
POTASSIUM SERPL-SCNC: 4 MMOL/L (ref 3.5–5.1)
RBC # BLD: 4.78 M/CU MM (ref 4.6–6.2)
SEGMENTED NEUTROPHILS ABSOLUTE COUNT: 5.3 K/CU MM
SEGMENTED NEUTROPHILS RELATIVE PERCENT: 56.3 % (ref 36–66)
SODIUM BLD-SCNC: 140 MMOL/L (ref 135–145)
SPECIMEN: NORMAL
SPECIMEN: NORMAL
TOTAL IMMATURE NEUTOROPHIL: 0.21 K/CU MM
TOTAL NUCLEATED RBC: 0 K/CU MM
TOTAL PROTEIN: 6.9 GM/DL (ref 6.4–8.2)
WBC # BLD: 9.4 K/CU MM (ref 4–10.5)

## 2024-04-08 PROCEDURE — 6370000000 HC RX 637 (ALT 250 FOR IP): Performed by: STUDENT IN AN ORGANIZED HEALTH CARE EDUCATION/TRAINING PROGRAM

## 2024-04-08 PROCEDURE — 94761 N-INVAS EAR/PLS OXIMETRY MLT: CPT

## 2024-04-08 PROCEDURE — APPSS60 APP SPLIT SHARED TIME 46-60 MINUTES: Performed by: NURSE PRACTITIONER

## 2024-04-08 PROCEDURE — 6370000000 HC RX 637 (ALT 250 FOR IP): Performed by: FAMILY MEDICINE

## 2024-04-08 PROCEDURE — 6360000002 HC RX W HCPCS: Performed by: STUDENT IN AN ORGANIZED HEALTH CARE EDUCATION/TRAINING PROGRAM

## 2024-04-08 PROCEDURE — 36415 COLL VENOUS BLD VENIPUNCTURE: CPT

## 2024-04-08 PROCEDURE — 80053 COMPREHEN METABOLIC PANEL: CPT

## 2024-04-08 PROCEDURE — 85025 COMPLETE CBC W/AUTO DIFF WBC: CPT

## 2024-04-08 PROCEDURE — 86140 C-REACTIVE PROTEIN: CPT

## 2024-04-08 RX ORDER — CYCLOBENZAPRINE HCL 10 MG
10 TABLET ORAL 3 TIMES DAILY
Qty: 30 TABLET | Refills: 0 | Status: SHIPPED | OUTPATIENT
Start: 2024-04-08 | End: 2024-04-18

## 2024-04-08 RX ORDER — DOXYCYCLINE HYCLATE 100 MG
100 TABLET ORAL EVERY 12 HOURS SCHEDULED
Qty: 14 TABLET | Refills: 0 | Status: SHIPPED | OUTPATIENT
Start: 2024-04-08 | End: 2024-04-15

## 2024-04-08 RX ORDER — DOXYCYCLINE HYCLATE 100 MG
100 TABLET ORAL EVERY 12 HOURS SCHEDULED
Status: DISCONTINUED | OUTPATIENT
Start: 2024-04-08 | End: 2024-04-08

## 2024-04-08 RX ORDER — DOXYCYCLINE HYCLATE 100 MG
100 TABLET ORAL EVERY 12 HOURS SCHEDULED
Status: DISCONTINUED | OUTPATIENT
Start: 2024-04-08 | End: 2024-04-08 | Stop reason: HOSPADM

## 2024-04-08 RX ORDER — OXYCODONE HYDROCHLORIDE AND ACETAMINOPHEN 5; 325 MG/1; MG/1
1 TABLET ORAL EVERY 6 HOURS PRN
Qty: 9 TABLET | Refills: 0 | Status: SHIPPED | OUTPATIENT
Start: 2024-04-08 | End: 2024-04-11

## 2024-04-08 RX ADMIN — DOXYCYCLINE HYCLATE 100 MG: 100 TABLET, COATED ORAL at 09:18

## 2024-04-08 RX ADMIN — BUPROPION HYDROCHLORIDE 100 MG: 100 TABLET ORAL at 09:18

## 2024-04-08 RX ADMIN — CYCLOBENZAPRINE HYDROCHLORIDE 10 MG: 10 TABLET, FILM COATED ORAL at 09:18

## 2024-04-08 RX ADMIN — OXYCODONE AND ACETAMINOPHEN 1 TABLET: 5; 325 TABLET ORAL at 06:01

## 2024-04-08 RX ADMIN — PANTOPRAZOLE SODIUM 40 MG: 40 TABLET, DELAYED RELEASE ORAL at 06:01

## 2024-04-08 RX ADMIN — CYCLOBENZAPRINE HYDROCHLORIDE 10 MG: 10 TABLET, FILM COATED ORAL at 14:12

## 2024-04-08 RX ADMIN — ENOXAPARIN SODIUM 30 MG: 100 INJECTION SUBCUTANEOUS at 09:19

## 2024-04-08 ASSESSMENT — PAIN DESCRIPTION - PAIN TYPE
TYPE: CHRONIC PAIN;ACUTE PAIN
TYPE: CHRONIC PAIN;ACUTE PAIN

## 2024-04-08 ASSESSMENT — PAIN SCALES - WONG BAKER
WONGBAKER_NUMERICALRESPONSE: NO HURT

## 2024-04-08 ASSESSMENT — PAIN DESCRIPTION - ORIENTATION
ORIENTATION: LEFT

## 2024-04-08 ASSESSMENT — PAIN DESCRIPTION - LOCATION
LOCATION: SHOULDER

## 2024-04-08 ASSESSMENT — PAIN - FUNCTIONAL ASSESSMENT
PAIN_FUNCTIONAL_ASSESSMENT: PREVENTS OR INTERFERES SOME ACTIVE ACTIVITIES AND ADLS
PAIN_FUNCTIONAL_ASSESSMENT: ACTIVITIES ARE NOT PREVENTED
PAIN_FUNCTIONAL_ASSESSMENT: PREVENTS OR INTERFERES SOME ACTIVE ACTIVITIES AND ADLS
PAIN_FUNCTIONAL_ASSESSMENT: PREVENTS OR INTERFERES SOME ACTIVE ACTIVITIES AND ADLS

## 2024-04-08 ASSESSMENT — PAIN DESCRIPTION - DESCRIPTORS
DESCRIPTORS: ACHING;DISCOMFORT;TIGHTNESS
DESCRIPTORS: ACHING;DISCOMFORT;TENDER
DESCRIPTORS: SORE;THROBBING

## 2024-04-08 ASSESSMENT — PAIN SCALES - GENERAL
PAINLEVEL_OUTOF10: 4
PAINLEVEL_OUTOF10: 8
PAINLEVEL_OUTOF10: 5

## 2024-04-08 ASSESSMENT — PAIN DESCRIPTION - FREQUENCY
FREQUENCY: CONTINUOUS
FREQUENCY: CONTINUOUS

## 2024-04-08 ASSESSMENT — PAIN DESCRIPTION - ONSET
ONSET: ON-GOING
ONSET: ON-GOING

## 2024-04-08 NOTE — PLAN OF CARE
Problem: Discharge Planning  Goal: Discharge to home or other facility with appropriate resources  4/7/2024 2156 by Emigdio Kumar RN  Outcome: Progressing  4/7/2024 1556 by Sindhu Guaman RN  Outcome: Progressing  Flowsheets (Taken 4/7/2024 0800)  Discharge to home or other facility with appropriate resources:   Identify barriers to discharge with patient and caregiver   Arrange for needed discharge resources and transportation as appropriate   Identify discharge learning needs (meds, wound care, etc)   Arrange for interpreters to assist at discharge as needed   Refer to discharge planning if patient needs post-hospital services based on physician order or complex needs related to functional status, cognitive ability or social support system     Problem: Pain  Goal: Verbalizes/displays adequate comfort level or baseline comfort level  4/7/2024 2156 by Emigdio Kumar RN  Outcome: Progressing  4/7/2024 1556 by Sindhu Guaman, RN  Outcome: Progressing     Problem: Safety - Adult  Goal: Free from fall injury  4/7/2024 2156 by Emigdio Kumar RN  Outcome: Progressing  4/7/2024 1556 by Sindhu Guaman, RN  Outcome: Progressing

## 2024-04-08 NOTE — CONSULTS
[facility] [place] Tele-Infectious Disease Consult Note      Name:  Jc Morris /Age/Sex: 1980  (43 y.o. male)   MRN & CSN:  [id number] & [id number] Encounter Date/Time: 2024 4:40 PM EDT   Location:  [facility]0/3020-A PCP: Mitzi Gibson PA     Attending:Dick Casas MD  Consulting Provider: Teodoro Newsome MD      Hospital Day: 3    This is a telemedicine consult requested by: Dr. Casas  I have Personally reviewed Lab Studies, Imaging, and discussed with Dr. 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             Assessment/Plan:   Sepsis: Leukocytosis, fever, and tachycardia. CT L-shoulder not indicative of osteo/PJI, however notable for axillary fat stranding. Unclear of exposures, cat -scratch potentially given axilla findings vs cellulitis. No apparent rash. 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            presented with several days of worsening should pain, fever and headache. 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 Evaluated by Ortho who have low c/f infection at this time. ID consulted for further evaluation.     Objective:     Intake/Output Summary (Last 24 hours) at 2024 1640  Last data filed at 2024 0839  Gross per 24 hour   Intake 10 ml   Output 200 ml 
Clinical Pharmacy Progress Note    Vancomycin has been discontinued. Pharmacy will sign off. Please re-consult pharmacy if vancomycin dosing is wanted in the future.    Please call with questions.  Destinee Vigil, PharmD, [facility], BCPS  Main Pharmacy: [id number]  4/3/2024 1:06 PM      
Infectious Disease Consult Note  2024   Patient Name: Jc Morris : 1980   Impression  Left Shoulder Inflammation:  S/p Left Shoulder Replacement 2023:  No reported allergies to ABX. CrCl 109. T-max 102.0 and has been afebrile since 4/3. Leukocytosis initially 17.7, normalized on . CRP has trended down from 276 to 16.7. Toxoplasmosis IgG, IgM, bartonella henselae AB all unremarkable. MRSA by PCR negative.   4/3-BC 0 NGTD  4/3-Left shoulder XR: no acute osseous abnormality.  4/3-CT Shoulder Left w contrast: 1. No CT findings to suggest acute osteomyelitis or septic arthritis of the left shoulder.   2. Left shoulder arthroplasty demonstrating satisfactory anatomic configuration.   3. Incidental left axillary fat stranding is nonspecific.   Dr. Ramsey, orthopedics, has an impression of left shoulder pain with low concern for infection. Rec to follow up with patient's own ortho surgeon after DC.  S/o .   Class I Obesity: BMI 32.68 kg/m2  Multi-morbidity: per PMHx: anxiety, HTN, obesity  Plan:  Continue po doxycycline 100 mg bid x 7 day cumulative course with end date 4/15/2024)  Follow up with ortho surgery as an op  DW patient the plan of care  OK from ID standpoint to DC when ready  Thank you for allowing me to consult in the care of this patient.  ------------------------  REASON FOR CONSULT: Infective syndrome \"possible left shoulder PJI\"  Requested by: Dr. Dick Casas  HPI:Patient is a 43 y.o.  male with a history of s/p left shoulder replacement at [facility] 2023, anxiety and HTN who was admitted 2024 for further evaluation and management of left shoulder pain, fever and headache. He noticed the pain worsening with decreased ROM since 3/30, as he was painting a house.  He presented with a T-max 102.0 and has been afebrile since 4/3. Leukocytosis initially 17.7, normalized on . CRP has trended down from 276 to 16.7. Toxoplasmosis IgG, IgM, bartonella henselae AB 
    Answer:   left shoulder [ain H/O left shoulder arthroplasty    Initiate Oxygen Therapy Protocol     Initiate oxygen therapy if the patient has 1) SpO2 is less than 90%, 2) Cyanosis, Chest Pain, Dyspnea, or Altered level of consciousness AND SpO2 checked and it is less than 90%, or 3) patient on Home oxygen.    To initiate oxygen therapy: nurse or RT enters Nasal cannula oxygen order using Per Protocol without Cosign order mode (if indication Home Oxygen then change L/min to same amount at home and change Wean to Room Air to No).    Notify provider if initiate oxygen therapy unless already on Home Oxygen.     Standing Status:   Standing     Number of Occurrences:   [id number]    ADMIT TO INPATIENT     Standing Status:   Standing     Number of Occurrences:   1     Order Specific Question:   Discharge Plan:     Answer:   Other/Uknown (Specify)     Order Specific Question:   Bed request comments     Answer:   From Valerie Lowry, MS, No tele       Assessment and Plan    A: Left shoulder pain, currently low concern for infection    P:   I did a thorough conversation with the patient as well as the hospitalist who is in the room at that time.  I explained that there is does not appear to be any type of true infection of the shoulder based off the CT findings as well as his current physical exam findings.  I did have some worry that based off of the CT findings the prosthesis did look like it was somewhat high riding in regards to the humerus in relation to the glenoid.  At this time we will get dedicated shoulder films to look at the overall alignment of the shoulder as there was some scatter from the prosthesis being in place as well on the CT.  However, per the read from the radiologist as well as my findings, there is low concern for infection at this time based off the CT findings including any type of significant effusion, changes around the prosthesis.  At this time we will hold off on any type of aspiration and if

## 2024-04-08 NOTE — PROGRESS NOTES
V2.0    [facility] Progress Note      Name:  Jc Morris /Age/Sex: 1980  (43 y.o. male)   MRN & CSN:  [id number] & [id number] Encounter Date/Time: 2024 7:20 AM EDT   Location:  [address] [place] [address] PCP: Mitzi Gibson PA     Attending:Dick Casas MD       Hospital Day: 4    Assessment and Recommendations   Jc Morris is a 43 y.o. male with pmh of generalized anxiety disorder, obesity who presents with Sepsis (HCC)      # Sepsis suspect secondary to cellulitis left axilla versus left shoulder: SIRS 3/4 with fever, tachycardia, leukocytosis lactic acid 1.2, started on ceftriaxone and vancomycin,  XR and CT soft tissue neck report reviewed, negative for acute abscess, adenopathy, misalignment of joint prosthesis or effusion.   Follow-up blood cultures   Pain control as needed  CT left shoulder no CT finding to suggest acute osteomyelitis or septic arthritis, left shoulder arthroplasty, incidental left axillary fat stranding  Consulted Ortho.  Discussed with them.  May need repeat CT and IR regarding drainage if continues to have leukocytosis or spikes fevers.  Infectious ease consulted.  Discussed.  They recommended Bartonella antibody testing and possible arthrocentesis with IR.  Minimal fluid noted on CT shoulder.  Awaiting IR's assessment of CT to see if there is any fluid that can be drained.  Started on Flexeril.  Plan to wean off opiates.     # Generalized Anxiety disorder  Continue home Wellbutrin     # Obesity/BMI 33.40  Counseled on dietary modification and weight loss    Comment: Please note this report has been produced using speech recognition software and may contain errors related to that system including errors in grammar, punctuation, and spelling, as well as words and phrases that may be inappropriate. If there are any questions or concerns please feel free to contact the dictating provider for clarification.     Diet ADULT DIET; Regular   DVT Prophylaxis [x] Lovenox, []  Heparin, [] 
    V2.0    [facility] Progress Note      Name:  Jc Morris /Age/Sex: 1980  (43 y.o. male)   MRN & CSN:  [id number] & [id number] Encounter Date/Time: 2024 7:20 AM EDT   Location:  [facility] PCP: Mitzi Gibson PA     Attending:Dick Casas MD       Hospital Day: 6    Assessment and Recommendations   Jc Morris is a 43 y.o. male with pmh of generalized anxiety disorder, obesity who presents with Sepsis (HCC)      # Sepsis suspect secondary to cellulitis left axilla versus left shoulder: SIRS 3/4 with fever, tachycardia, leukocytosis lactic acid 1.2, started on ceftriaxone and vancomycin,  XR and CT soft tissue neck report reviewed, negative for acute abscess, adenopathy, misalignment of joint prosthesis or effusion.   Follow-up blood cultures   Pain control as needed  CT left shoulder no CT finding to suggest acute osteomyelitis or septic arthritis, left shoulder arthroplasty, incidental left axillary fat stranding  Consulted Ortho.  Discussed with them.  May need repeat CT and IR regarding drainage if continues to have leukocytosis or spikes fevers.  Infectious ease consulted.  Discussed.  They recommended Bartonella antibody testing and possible arthrocentesis with IR. Toxoplasmosis Ab -ve.  Minimal fluid noted on CT shoulder.  Discussed with IR.  Not amenable to draining..  Started on Flexeril.  Plan to wean off opiates.  Discussed with ID again.  Plan for in person evaluation on Monday by ID and continue doxycycline until then.  Patient agreeable with plan.     # Generalized Anxiety disorder  Continue home Wellbutrin     # Obesity/BMI 33.40  Counseled on dietary modification and weight loss    Comment: Please note this report has been produced using speech recognition software and may contain errors related to that system including errors in grammar, punctuation, and spelling, as well as words and phrases that may be inappropriate. If there are any questions or concerns please feel free to 
    V2.0    [facility] Progress Note      Name:  Jc Morris /Age/Sex: 1980  (43 y.o. male)   MRN & CSN:  [id number] & [id number] Encounter Date/Time: 4/3/2024 7:20 AM EDT   Location:  [address] [place] [address] PCP: Mitzi Gibson PA     Attending:Juan Rashid MD       Hospital Day: 2    Assessment and Recommendations   Jc Morris is a 43 y.o. male with pmh of generalized anxiety disorder, obesity who presents with Sepsis (HCC)      # Sepsis suspect secondary to cellulitis left axilla versus left shoulder: SIRS 3/4 with fever, tachycardia, leukocytosis lactic acid 1.2, started on ceftriaxone and vancomycin,  XR and CT soft tissue neck report reviewed, negative for acute abscess, adenopathy, misalignment of joint prosthesis or effusion.   Follow-up blood cultures   Pain control as needed  CT left shoulder no CT finding to suggest acute osteomyelitis or septic arthritis, left shoulder arthroplasty, incidental left axillary fat stranding  Consulted Ortho  MRSA PCR negative, discontinue vancomycin continue ceftriaxone     # Generalized Anxiety disorder  Continue home Wellbutrin     # Obesity/BMI 33.40  Counseled on dietary modification and weight loss    Comment: Please note this report has been produced using speech recognition software and may contain errors related to that system including errors in grammar, punctuation, and spelling, as well as words and phrases that may be inappropriate. If there are any questions or concerns please feel free to contact the dictating provider for clarification.     Diet ADULT DIET; Regular   DVT Prophylaxis [x] Lovenox, []  Heparin, [] SCDs, [] Ambulation,  [] Eliquis, [] Xarelto  [] Coumadin   Code Status Full Code   Disposition From: Home  Expected Disposition: Home  Estimated Date of Discharge: TBD  Patient requires continued admission due to sepsis   Surrogate Decision Maker/ POA Self     Personally reviewed Lab Studies and Imaging     Discussed management of the case 
    V2.0  [facility] [place]      Name:  Jc Morris /Age/Sex: 1980  (43 y.o. male)   MRN & CSN:  [id number] & [id number] Encounter Date/Time: 2024 12:10 PM EDT    Location:  [address] [place] [address] PCP: Mitzi Gibson PA       Hospital Day: 4        I have Personally reviewed Lab Studies, Imaging, and discussed with Dr. 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                Assessment/Plan:   Sepsis: Leukocytosis, fever, and tachycardia. CT L-shoulder not indicative of osteo/PJI, however notable for axillary fat stranding. Reports next door neighbor at job site had multiple cats. Cat -scratch potentially given axilla findings vs cellulitis vs toxo which would be self-limited. Notable improvement with start of doxycycline over night, unclear causality.   L-Shoulder Arthroplasty         MRSA -  Bld Cx NGTD  CRP trend  Bartonella serology pending  Toxo IgM, IgG sent  IR re-evaluate potential shoulder drainage  C/w Doxycycline 100 mg BID  D/c Ceftriaxone      ID will continue to follow, Dr. Guerrier will be back Monday  Please page/call with any further questions      Subjective:     Chief Complaint: No chief complaint on file.        Notable improvement overnight in shoulder pain with now erythema, tenderness in back and chest. But states much improved when he awoke over night.         Objective:   No intake or output data in the 24 hours ending 24 1210     Vitals:   Vitals:    24 0830   BP: 121/85   Pulse: 84   Resp: 12   Temp: 99 °F (37.2 °C)   SpO2: 99%       Physical Exam:     Physical Exam not performed as this was a Tele-Consult    Medications:   Medications:    
   04/08/24 1038   Encounter Summary   Encounter Overview/Reason  Initial Encounter   Service Provided For: Patient   Referral/Consult From: [facility]   Support System Family members   Last Encounter  04/08/24  (PT ready to go home but he doesn't have the clear yet, he's been anxious over his condition, concerned with knowing the cause of the infection, he was calm, he wanted prayer.)   Complexity of Encounter Low   Begin Time 1025   End Time  1040   Total Time Calculated 15 min   Spiritual/Emotional needs   Type Spiritual Support   Grief, Loss, and Adjustments   Type Adjustment to illness   Assessment/Intervention/Outcome   Assessment Anxious;Concerns with suffering;Coping   Intervention Active listening;Discussed illness injury and it’s impact;Discussed relationship with God;Nurtured Hope;Prayer (assurance of)/[place];Sustaining Presence/Ministry of presence   Outcome Comfort;Coping;Encouraged;Expressed Gratitude   Plan and Referrals   Plan/Referrals Continue Support (comment)       
4 Eyes Skin Assessment     NAME:  Jc Morris  YOB: 1980  MEDICAL RECORD NUMBER:  [id number]    The patient is being assessed for  Admission    I agree that at least one RN has performed a thorough Head to Toe Skin Assessment on the patient. ALL assessment sites listed below have been assessed.      Areas assessed by both nurses:    Head, Face, Ears, Shoulders, Back, Chest, Arms, Elbows, Hands, Sacrum. Buttock, Coccyx, Ischium, Legs. Feet and Heels, and Under Medical Devices         Does the Patient have a Wound? No noted wound(s)       Miquel Prevention initiated by RN: No  Wound Care Orders initiated by RN: No    Pressure Injury (Stage 3,4, Unstageable, DTI, NWPT, and Complex wounds) if present, place Wound referral order by RN under : No    New Ostomies, if present place, Ostomy referral order under : No     Nurse 1 eSignature: Electronically signed by Hector Harris RN on 4/3/24 at 1:36 AM EDT    **SHARE this note so that the co-signing nurse can place an eSignature**    Nurse 2 eSignature: Electronically signed by Marti Nguyen RN on 4/3/24 at 2:22 AM EDT  
CT called and needs BMP prior to CT with contrast that is ordered.   Notified Page [facility] Hospitalist to make aware   
New orders for BMP   Called Lab and Phlebotomy will come and obtain labs   
PHARMACY VANCOMYCIN MONITORING SERVICE  Pharmacy consulted by Norberto Gasca for monitoring and adjustment.    Indication for treatment: Vancomycin indication: Bone/Joint infection   Goal trough: Trough Goal: 10-15 mcg/mL  AUC/STERLING: 400-600    Risk Factors for MRSA Identified:   Hospitalization within the past 90 days    Pertinent Laboratory Values:   Temp Readings from Last 3 Encounters:   04/03/24 99.9 °F (37.7 °C) (Oral)     Recent Labs     04/03/24  0047   WBC 17.7*     Recent Labs     04/03/24  0047   BUN 17   CREATININE 1.2     Estimated Creatinine Clearance: 101 mL/min (based on SCr of 1.2 mg/dL).  No intake or output data in the 24 hours ending 04/03/24 0231  Last Encounter Weight:  Wt Readings from Last 3 Encounters:   04/02/24 111.6 kg (246 lb 0.5 oz)       Pertinent Cultures:   Date    Source    Results  4/3   Blood    pending        4/3   MRSA Nasal   ordered            Vancomycin level:   TROUGH:  No results for input(s): \"VANCOTROUGH\" in the last 72 hours.  RANDOM:  No results for input(s): \"VANCORANDOM\" in the last 72 hours.    Assessment:  HPI:  anxiety disorder and Left shoulder arthroplasty in Nov, 2023 presented as a transfer from [facility] for evaluation and management of left shoulder pain, fever and headache.     SCr, BUN, and urine output: 1.2, 17  Day(s) of therapy: 1  Vancomycin concentration: to be collected     Plan:  Current Vancomycin Dose: 2000 mg as LD; then vancomycin 1250 mg every 12 hours (506/15)   Pharmacy will continue to monitor patient and adjust therapy as indicated    VANCOMYCIN CONCENTRATION SCHEDULED FOR 4/5 @0600    Thank you for the consult.  Margarita Oseguera RPH  4/3/2024 2:31 AM   
TBD  Patient requires continued admission due to sepsis   Surrogate Decision Maker/ POA Self     Personally reviewed Lab Studies and Imaging     Discussed management of the case with Ortho who recommended continue antibiotics    Imaging that was interpreted personally includes CT left shoulder and results left axillary fat stranding    Drugs that require monitoring for toxicity include Lovenox and the method of monitoring was BMP        Subjective:     Chief Complaint: Left shoulder pain      Patient seen and examined at bedside.  Reports shoulder pain slightly improved compared to yesterday.  Denies any nausea, vomiting, fevers or chills.    Review of Systems:      Pertinent positives and negatives discussed in HPI    Objective:     Intake/Output Summary (Last 24 hours) at 4/4/2024 1121  Last data filed at 4/4/2024 0839  Gross per 24 hour   Intake 10 ml   Output 200 ml   Net -190 ml      Vitals:   Vitals:    04/04/24 0210 04/04/24 0525 04/04/24 0625 04/04/24 0835   BP: 122/80   120/79   Pulse: (!) 102   97   Resp: 18 20 20 19   Temp: 98.9 °F (37.2 °C)   99.2 °F (37.3 °C)   TempSrc: Oral   Oral   SpO2: 98%   96%   Weight:       Height:             Physical Exam:      General: Afebrile, no distress  Eyes: EOMI  ENT: neck supple  Cardiovascular: Regular rate.  Respiratory: Clear to auscultation  Gastrointestinal: Soft, non tender  Genitourinary: no suprapubic tenderness  Musculoskeletal: Tenderness over the left shoulder with decreased range of motion and decreased strength  Skin: warm, dry  Neuro: Alert.  Psych: Mood appropriate.         Medications:   Medications:    cyclobenzaprine  10 mg Oral TID    buPROPion  100 mg Oral BID    enoxaparin  30 mg SubCUTAneous BID    pantoprazole  40 mg Oral QAM AC    sodium chloride flush  5-40 mL IntraVENous 2 times per day    cefTRIAXone (ROCEPHIN) IV  2,000 mg IntraVENous Q24H      Infusions:    sodium chloride       PRN Meds: oxyCODONE-acetaminophen, 1 tablet, Q6H 
provider for clarification.     Diet ADULT DIET; Regular   DVT Prophylaxis [x] Lovenox, []  Heparin, [] SCDs, [] Ambulation,  [] Eliquis, [] Xarelto  [] Coumadin   Code Status Full Code   Disposition From: Home  Expected Disposition: Home  Estimated Date of Discharge: 1-2  Patient requires continued admission due to sepsis   Surrogate Decision Maker/ POA Self     Personally reviewed Lab Studies and Imaging     Discussed management of the case with Ortho who recommended continue antibiotics    Imaging that was interpreted personally includes CT left shoulder and results left axillary fat stranding    Drugs that require monitoring for toxicity include Lovenox and the method of monitoring was BMP        Subjective:     Chief Complaint: Left shoulder pain      Patient seen and examined at bedside reports pain is further improving.  Denies any nausea, vomiting, fevers or chills.    Review of Systems:      Pertinent positives and negatives discussed in HPI    Objective:   No intake or output data in the 24 hours ending 04/06/24 1001     Vitals:   Vitals:    04/05/24 2207 04/06/24 0638 04/06/24 0708 04/06/24 0830   BP:    117/83   Pulse:    89   Resp: 16 16 17 17   Temp:    97.8 °F (36.6 °C)   TempSrc:    Oral   SpO2:    97%   Weight:       Height:             Physical Exam:      General: Afebrile, no distress  Eyes: EOMI  ENT: neck supple  Cardiovascular: Regular rate.  Respiratory: Clear to auscultation  Gastrointestinal: Soft, non tender  Genitourinary: no suprapubic tenderness  Musculoskeletal: Tenderness over the left shoulder with decreased range of motion and slightly decreased strength.  Tenderness to palpation now present over the clavicle as well.  No erythema noted in the axillary area.  Skin: warm, dry  Neuro: Alert.  Psych: Mood appropriate.         Medications:   Medications:    cyclobenzaprine  10 mg Oral TID    doxycycline  100 mg Oral 2 times per day    buPROPion  100 mg Oral BID    enoxaparin  30 mg 
Positive for activity change. Negative for fatigue and unexpected weight change.   HENT:  Negative for hearing loss, sneezing and sore throat.    Respiratory:  Negative for cough, shortness of breath and wheezing.    Cardiovascular:  Negative for chest pain and leg swelling.   Gastrointestinal:  Negative for diarrhea, nausea and vomiting.   Musculoskeletal:  Positive for arthralgias and myalgias. Negative for back pain, gait problem, joint swelling, neck pain and neck stiffness.   Skin:  Negative for color change, pallor, rash and wound.   Neurological:  Positive for weakness. Negative for speech difficulty and numbness.   Psychiatric/Behavioral:  Negative for behavioral problems and confusion. The patient is not hyperactive.                                                    Examination:  General Exam:  Vitals: /83   Pulse 89   Temp 97.8 °F (36.6 °C) (Oral)   Resp 17   Ht 1.828 m (5' 11.97\") Comment: 6 ft  Wt 111.6 kg (246 lb 0.5 oz)   SpO2 97%   BMI 33.40 kg/m²    Physical Exam  Constitutional:       Appearance: Normal appearance.   HENT:      Head: Normocephalic and atraumatic.      Nose: Nose normal.   Eyes:      General:         Right eye: No discharge.         Left eye: No discharge.      Extraocular Movements: Extraocular movements intact.   Cardiovascular:      Pulses: Normal pulses.   Pulmonary:      Effort: Pulmonary effort is normal.      Breath sounds: Normal breath sounds.   Musculoskeletal:         General: Tenderness present. No swelling, deformity or signs of injury.      Right shoulder: Normal.      Left shoulder: Tenderness present. No swelling, deformity, effusion, laceration, bony tenderness or crepitus. Decreased range of motion. Decreased strength. Normal pulse.      Right upper arm: Normal.      Left upper arm: Normal.      Right elbow: Normal.      Left elbow: Normal.      Right forearm: Normal.      Left forearm: Normal.      Right wrist: Normal.      Left wrist: Normal.      
Reason for exam:     Answer:   concern for prosthetic joint infection     Order Specific Question:   Additional Contrast?     Answer:   1    XR SHOULDER LEFT (MIN 2 VIEWS)     Standing Status:   Standing     Number of Occurrences:   1     Order Specific Question:   Reason for exam:     Answer:   shouldr pain with prior replacement    Comprehensive Metabolic Panel w/ Reflex to MG     Standing Status:   Standing     Number of Occurrences:   1    CBC with Auto Differential     Standing Status:   Standing     Number of Occurrences:   1    CBC with Auto Differential     Standing Status:   Standing     Number of Occurrences:   1    Basic Metabolic Panel     Standing Status:   Standing     Number of Occurrences:   3    ADULT DIET; Regular     Standing Status:   Standing     Number of Occurrences:   1     Order Specific Question:   Primary Diet     Answer:   Regular    Vital signs per unit routine     Standing Status:   Standing     Number of Occurrences:   1    Daily weights     Standing Status:   Standing     Number of Occurrences:   1    Intake and output     Call for urine ouput less than 0.5mL/kg/hr     Standing Status:   Standing     Number of Occurrences:   21    Initiate Hypoglycemia Treatment     If patient has hypoglycemia symptoms or blood glucose reading <70mg/dL on lab draw, obtain POC blood glucose.      If a POC blood glucose is less than 70mg/dL and Hypoglycemia Treatment Orders are not already active, then Nurse to enter Hypoglycemia Treatment Orders using Per Protocol without Cosign order mode, follow orders, and notify provider.      If Hypoglycemia Treatment Orders are already active, follow orders.  Hypoglycemia symptoms include but are not limited to: lightheadedness, sweating, tachycardia, and altered mental status.       Standing Status:   Standing     Number of Occurrences:   1    Admission/Observation order previously placed     Standing Status:   Standing     Number of Occurrences:   1    Notify

## 2024-04-08 NOTE — DISCHARGE INSTR - DIET
Good nutrition is important when healing from an illness, injury, or surgery.  Follow any nutrition recommendations given to you during your hospital stay.   If you were given an oral nutrition supplement while in the hospital, continue to take this supplement at home.  You can take it with meals, in-between meals, and/or before bedtime. These supplements can be purchased at most local grocery stores, pharmacies, and chain [employer]-stores.   If you have any questions about your diet or nutrition, call the hospital and ask for the dietitian.    Resume normal diet as tolerated

## 2024-04-09 NOTE — DISCHARGE SUMMARY
on Flexeril.  Pain improved.  Discussed with ID again.  ID encouraged and evaluated this patient on 4/8 and recommended 1 week course of doxycycline.  Plan to follow-up in ID and infectious disease doctor office within 2 weeks.    # Generalized Anxiety disorder  Continue home Wellbutrin     # Obesity/BMI 33.40  Counseled on dietary modification and weight loss      The patient expressed appropriate understanding of, and agreement with the discharge recommendations, medications, and plan.     Consults this admission:  PHARMACY TO DOSE VANCOMYCIN  IP CONSULT TO ORTHOPEDIC SURGERY  IP CONSULT TO INFECTIOUS DISEASES    Discharge Diagnosis:   Sepsis (HCC)  Cellulitis of the left axilla versus left shoulder septic arthritis  Generalized anxiety disorder  Obesity    Discharge Instruction:   Follow up appointments: PCP, ID, orthopedic surgery  Primary care physician: Mitzi Gibson PA within 2 weeks  Diet: regular diet   Activity: activity as tolerated  Disposition: Discharged to:   [x]Home, []C, []SNF, []Acute Rehab, []Hospice   Condition on discharge: Stable  Labs and Tests to be Followed up as an outpatient by PCP or Specialist: Doxycyline finish course.  Outpatient follow-up with orthopedic surgery and infectious disease.    Discharge Medications:        Medication List        START taking these medications      cyclobenzaprine 10 MG tablet  Commonly known as: FLEXERIL  Take 1 tablet by mouth 3 times daily for 10 days     doxycycline hyclate 100 MG tablet  Commonly known as: VIBRA-TABS  Take 1 tablet by mouth every 12 hours for 14 doses     oxyCODONE-acetaminophen 5-325 MG per tablet  Commonly known as: PERCOCET  Take 1 tablet by mouth every 6 hours as needed for Pain for up to 3 days. Max Daily Amount: 4 tablets            CONTINUE taking these medications      buPROPion 100 MG tablet  Commonly known as: WELLBUTRIN     ibuprofen 200 MG tablet  Commonly known as: ADVIL;MOTRIN               Where to Get Your 
CT Surgery

## 2024-04-10 ENCOUNTER — TELEPHONE (OUTPATIENT)
Dept: INFECTIOUS DISEASES | Age: 44
End: 2024-04-10

## 2024-04-10 NOTE — TELEPHONE ENCOUNTER
Called from pt stating that since he started the Doxycycline he has had a rash on his back moving around the side and front.  Is there a different ABX, or a medication he can take to help the rash?  Please advise.

## (undated) DEVICE — SYRINGE, LUER LOCK, 10ML: Brand: MEDLINE

## (undated) DEVICE — Z INACTIVE USE 2660664 SOLUTION IRRIG 3000ML 0.9% SOD CHL USP UROMATIC PLAS CONT

## (undated) DEVICE — 3M™ STERI-DRAPE™ U-DRAPE 1015: Brand: STERI-DRAPE™

## (undated) DEVICE — TUBING PMP L16FT MAIN DISP FOR AR-6400 AR-6475

## (undated) DEVICE — PADDING CAST N ADH 12X6 IN CRIMPED FINISH 100% COTTON WBRLII

## (undated) DEVICE — 1100 SINGLE MAGNET NEEDLE COUNTER,BLACK: Brand: DEVON

## (undated) DEVICE — ARTHROSCOPY PACK: Brand: MEDLINE INDUSTRIES, INC.

## (undated) DEVICE — BLADE SHV L13CM DIA4MM DBL CUT COOLCUT

## (undated) DEVICE — LINER SUCT CANSTR 1500CC SEMI RIG W/ POR HYDROPHOBIC SHUT

## (undated) DEVICE — GLOVE,SURG,TRIUMPH MICRO,LTX,PF,7.5: Brand: MEDLINE

## (undated) DEVICE — TOWEL,OR,DSP,ST,BLUE,STD,6/PK,12PK/CS: Brand: MEDLINE

## (undated) DEVICE — SUTURE ETHLN SZ 3-0 L30IN NONABSORBABLE BLK FS-1 L24MM 3/8 669H

## (undated) DEVICE — STANDARD HYPODERMIC NEEDLE,POLYPROPYLENE HUB: Brand: MONOJECT

## (undated) DEVICE — BANDAGE COMPR M W6INXL10YD WHT BGE VELC E MTRX HK AND LOOP

## (undated) DEVICE — CLASSIC CLD THER SYS

## (undated) DEVICE — KENDALL 500 SERIES DIAPHORETIC FOAM MONITORING ELECTRODE - TEAR DROP SHAPE ( 30/PK): Brand: KENDALL

## (undated) DEVICE — Device

## (undated) DEVICE — GLOVE SURG SZ 75 L12IN FNGR THK87MIL WHT LTX FREE

## (undated) DEVICE — BANDAGE COMPR W6INXL5YD WHT BGE POLY COT M E WRP WV HK AND

## (undated) DEVICE — ANESTHESIA CIRCUIT ADULT-LF: Brand: MEDLINE INDUSTRIES, INC.

## (undated) DEVICE — PAD,ABDOMINAL,5"X9",STERILE,LF,1/PK: Brand: MEDLINE INDUSTRIES, INC.

## (undated) DEVICE — GOWN,SIRUS,POLYRNF,BRTHSLV,XLN/XL,20/CS: Brand: MEDLINE

## (undated) DEVICE — PADDING CAST W6INXL4YD COT LO LINTING WYTEX

## (undated) DEVICE — GLOVE SURG SZ 7 L12IN FNGR THK75MIL WHT LTX POLYMER BEAD

## (undated) DEVICE — SOLUTION IV IRRIG WATER 1000ML POUR BRL 2F7114

## (undated) DEVICE — LINER,SEMI-RIGID,3000CC,50EA/CS: Brand: MEDLINE

## (undated) DEVICE — GLOVE SURG SZ 7 L12IN FNGR THK87MIL WHT LTX FREE

## (undated) DEVICE — CHLORAPREP 26ML ORANGE